# Patient Record
Sex: MALE | Race: WHITE | NOT HISPANIC OR LATINO | Employment: FULL TIME | URBAN - METROPOLITAN AREA
[De-identification: names, ages, dates, MRNs, and addresses within clinical notes are randomized per-mention and may not be internally consistent; named-entity substitution may affect disease eponyms.]

---

## 2017-01-11 ENCOUNTER — OFFICE VISIT (OUTPATIENT)
Dept: URGENT CARE | Facility: CLINIC | Age: 27
End: 2017-01-11
Payer: COMMERCIAL

## 2017-01-11 VITALS
DIASTOLIC BLOOD PRESSURE: 72 MMHG | HEIGHT: 72 IN | BODY MASS INDEX: 27.63 KG/M2 | RESPIRATION RATE: 18 BRPM | OXYGEN SATURATION: 97 % | SYSTOLIC BLOOD PRESSURE: 110 MMHG | WEIGHT: 204 LBS | TEMPERATURE: 99.2 F | HEART RATE: 90 BPM

## 2017-01-11 DIAGNOSIS — J40 BRONCHITIS: ICD-10-CM

## 2017-01-11 DIAGNOSIS — J32.9 OTHER SINUSITIS: ICD-10-CM

## 2017-01-11 PROCEDURE — 99214 OFFICE O/P EST MOD 30 MIN: CPT | Performed by: PHYSICIAN ASSISTANT

## 2017-01-11 RX ORDER — AZITHROMYCIN 250 MG/1
TABLET, FILM COATED ORAL
Qty: 6 TAB | Refills: 1 | Status: SHIPPED | OUTPATIENT
Start: 2017-01-11 | End: 2018-01-22

## 2017-01-11 RX ORDER — ALBUTEROL SULFATE 90 UG/1
2 AEROSOL, METERED RESPIRATORY (INHALATION) EVERY 4 HOURS PRN
Qty: 1 INHALER | Refills: 0 | Status: SHIPPED | OUTPATIENT
Start: 2017-01-11 | End: 2018-01-22

## 2017-01-11 RX ORDER — ACETAMINOPHEN 500 MG
500-1000 TABLET ORAL EVERY 6 HOURS PRN
COMMUNITY
End: 2018-01-22

## 2017-01-11 ASSESSMENT — ENCOUNTER SYMPTOMS
CARDIOVASCULAR NEGATIVE: 1
SHORTNESS OF BREATH: 0
COUGH: 1
SPUTUM PRODUCTION: 1
FEVER: 0
EYES NEGATIVE: 1
SORE THROAT: 0
CONSTITUTIONAL NEGATIVE: 1

## 2017-01-11 NOTE — MR AVS SNAPSHOT
Roman Magana   2017 2:45 PM   Office Visit   MRN: [id number]    Department:  [facility]   Dept Phone:  [phone]    Description:  Male : 1990   Provider:  Minh Rangel PA-C           Reason for Visit     Cough x 2-3 days having cough and nasal congestion and drainage, dome headache, when he walks or does too much he gets dizzy       Allergies as of 2017     No Known Allergies      You were diagnosed with     Other sinusitis   [[id number]]       Bronchitis   [[id number]]         Vital Signs     Blood Pressure Pulse Temperature Respirations Height Weight    110/72 mmHg 90 37.3 °C (99.2 °F) 18 1.829 m (6') 92.534 kg (204 lb)    Body Mass Index Oxygen Saturation                27.66 kg/m2 97%          Basic Information     Date Of Birth Sex Race Ethnicity Preferred Language    1990 Male White Non- English      Health Maintenance        Date Due Completion Dates    IMM DTaP/Tdap/Td Vaccine (1 - Tdap) 2007 ---    IMM INFLUENZA (1) 2016 ---            Current Immunizations     No immunizations on file.      Below and/or attached are the medications your provider expects you to take. Review all of your home medications and newly ordered medications with your provider and/or pharmacist. Follow medication instructions as directed by your provider and/or pharmacist. Please keep your medication list with you and share with your provider. Update the information when medications are discontinued, doses are changed, or new medications (including over-the-counter products) are added; and carry medication information at all times in the event of emergency situations     Allergies:  No Known Allergies          Medications  Valid as of: 2017 -  2:59 PM    Generic Name Brand Name Tablet Size Instructions for use    Acetaminophen (Tab) TYLENOL 500 MG Take 500-1,000 mg by mouth every 6 hours as needed.        Albuterol Sulfate (Aero Soln) albuterol 108 (90 BASE)  MCG/ACT Inhale 2 Puffs by mouth every four hours as needed for Shortness of Breath.        Azithromycin (Tab) ZITHROMAX 250 MG z-jefe; U.D.        .                 Medicines prescribed today were sent to:     [facility][id number] - [place] - [address] [place] [address]    Phone: [phone] Fax: [phone]    Open 24 Hours?: No      Medication refill instructions:       If your prescription bottle indicates you have medication refills left, it is not necessary to call your provider’s office. Please contact your pharmacy and they will refill your medication.    If your prescription bottle indicates you do not have any refills left, you may request refills at any time through one of the following ways: The online Dakim system (except Urgent Care), by calling your provider’s office, or by asking your pharmacy to contact your provider’s office with a refill request. Medication refills are processed only during regular business hours and may not be available until the next business day. Your provider may request additional information or to have a follow-up visit with you prior to refilling your medication.   *Please Note: Medication refills are assigned a new Rx number when refilled electronically. Your pharmacy may indicate that no refills were authorized even though a new prescription for the same medication is available at the pharmacy. Please request the medicine by name with the pharmacy before contacting your provider for a refill.           Dakim Access Code: [email or web address]  Expires: 2/10/2017  2:59 PM    Your email address is not on file at [facility].  Email Addresses are required for you to sign up for Dakim, please contact [phone] to verify your personal information and to provide your email address prior to attempting to register for Dakim.    Roman Magana  [address] [place] [address]    Dakim  A secure, online tool to manage your health information          [employer]’s [facility]® is a secure, online tool that connects you to your personalized health information from the privacy of your home -- day or night - making it very easy for you to manage your healthcare. Once the activation process is completed, you can even access your medical information using the SpikeSourcet irma, which is available for free in the Apple Irma store or Google Play store.     To learn more about [facility], visit [email or web address]    There are two levels of access available (as shown below):   My Chart Features  [facility] Primary Care Doctor [facility]  Specialists [facility]  Urgent  Care Non-[facility] Primary Care Doctor   Email your healthcare team securely and privately 24/7 X X X    Manage appointments: schedule your next appointment; view details of past/upcoming appointments X      Request prescription refills. X      View recent personal medical records, including lab and immunizations X X X X   View health record, including health history, allergies, medications X X X X   Read reports about your outpatient visits, procedures, consult and ER notes X X X X   See your discharge summary, which is a recap of your hospital and/or ER visit that includes your diagnosis, lab results, and care plan X X  X     How to register for [facility]:  Once your e-mail address has been verified, follow the following steps to sign up for [facility].     1. Go to  [email or web address]  2. Click on the Sign Up Now box, which takes you to the New Member Sign Up page. You will need to provide the following information:  a. Enter your [facility] Access Code exactly as it appears at the top of this page. (You will not need to use this code after you’ve completed the sign-up process. If you do not sign up before the expiration date, you must request a new code.)   b. Enter your date of birth.   c. Enter your home email address.   d. Click Submit, and follow the next screen’s instructions.  3. Create a [facility] ID. This will be  your [employer] login ID and cannot be changed, so think of one that is secure and easy to remember.  4. Create a Seakeeper password. You can change your password at any time.  5. Enter your Password Reset Question and Answer. This can be used at a later time if you forget your password.   6. Enter your e-mail address. This allows you to receive e-mail notifications when new information is available in Seakeeper.  7. Click Sign Up. You can now view your health information.    For assistance activating your Seakeeper account, call [phone]

## 2017-01-11 NOTE — PROGRESS NOTES
Subjective:      Roman Magana is a 26 y.o. male who presents with Cough            Cough  This is a new problem. The current episode started in the past 7 days. The problem has been unchanged. The problem occurs every few minutes. The cough is productive of sputum. Associated symptoms include nasal congestion. Pertinent negatives include no fever, sore throat or shortness of breath. Nothing aggravates the symptoms. He has tried nothing for the symptoms. The treatment provided no relief. There is no history of asthma or environmental allergies.       Review of Systems   Constitutional: Negative.  Negative for fever.   HENT: Positive for congestion. Negative for sore throat.    Eyes: Negative.    Respiratory: Positive for cough and sputum production. Negative for shortness of breath.    Cardiovascular: Negative.    Skin: Negative.    Endo/Heme/Allergies: Negative for environmental allergies.          Objective:     /72 mmHg  Pulse 90  Temp(Src) 37.3 °C (99.2 °F)  Resp 18  Ht 1.829 m (6')  Wt 92.534 kg (204 lb)  BMI 27.66 kg/m2  SpO2 97%     Physical Exam   Constitutional: He is oriented to person, place, and time. He appears well-developed and well-nourished. No distress.   HENT:   Head: Normocephalic and atraumatic.   Mouth/Throat: No oropharyngeal exudate (+maxill sinus pasha/press).   Eyes: EOM are normal. Pupils are equal, round, and reactive to light.   Neck: Normal range of motion. Neck supple.   Cardiovascular: Normal rate.    Pulmonary/Chest: Effort normal and breath sounds normal. No respiratory distress. He has no wheezes. He has no rales.   Neurological: He is alert and oriented to person, place, and time.   Skin: Skin is warm and dry.   Psychiatric: He has a normal mood and affect. His behavior is normal. Judgment and thought content normal.   Nursing note and vitals reviewed.    Filed Vitals:    01/11/17 1442   BP: 110/72   Pulse: 90   Temp: 37.3 °C (99.2 °F)   Resp: 18   Height:  1.829 m (6')   Weight: 92.534 kg (204 lb)   SpO2: 97%     Active Ambulatory Problems     Diagnosis Date Noted   • No Active Ambulatory Problems     Resolved Ambulatory Problems     Diagnosis Date Noted   • No Resolved Ambulatory Problems     No Additional Past Medical History     No current outpatient prescriptions on file prior to visit.     No current facility-administered medications on file prior to visit.     Gargles, Cepacol lozenges, Aleve/Advil as needed for throat pain  History reviewed. No pertinent family history.  Review of patient's allergies indicates no known allergies.              Assessment/Plan:     ·  sinusitis, bronchitis      · Zpak; albut

## 2018-01-22 ENCOUNTER — OFFICE VISIT (OUTPATIENT)
Dept: MEDICAL GROUP | Facility: PHYSICIAN GROUP | Age: 28
End: 2018-01-22
Payer: COMMERCIAL

## 2018-01-22 ENCOUNTER — HOSPITAL ENCOUNTER (OUTPATIENT)
Facility: MEDICAL CENTER | Age: 28
End: 2018-01-22
Attending: NURSE PRACTITIONER
Payer: COMMERCIAL

## 2018-01-22 VITALS
SYSTOLIC BLOOD PRESSURE: 102 MMHG | OXYGEN SATURATION: 95 % | RESPIRATION RATE: 16 BRPM | TEMPERATURE: 97.9 F | HEART RATE: 84 BPM | BODY MASS INDEX: 28.44 KG/M2 | WEIGHT: 210 LBS | DIASTOLIC BLOOD PRESSURE: 70 MMHG | HEIGHT: 72 IN

## 2018-01-22 DIAGNOSIS — Z00.00 PREVENTATIVE HEALTH CARE: ICD-10-CM

## 2018-01-22 DIAGNOSIS — R36.1 EJACULATION BLOOD: ICD-10-CM

## 2018-01-22 DIAGNOSIS — K59.1 FUNCTIONAL DIARRHEA: ICD-10-CM

## 2018-01-22 DIAGNOSIS — Z87.828 HISTORY OF NECK INJURY: ICD-10-CM

## 2018-01-22 LAB
APPEARANCE UR: CLEAR
BILIRUB UR STRIP-MCNC: NEGATIVE MG/DL
COLOR UR AUTO: YELLOW
GLUCOSE UR STRIP.AUTO-MCNC: NEGATIVE MG/DL
KETONES UR STRIP.AUTO-MCNC: NEGATIVE MG/DL
LEUKOCYTE ESTERASE UR QL STRIP.AUTO: NEGATIVE
NITRITE UR QL STRIP.AUTO: NEGATIVE
PH UR STRIP.AUTO: 6.5 [PH] (ref 5–8)
PROT UR QL STRIP: NEGATIVE MG/DL
RBC UR QL AUTO: NORMAL
SP GR UR STRIP.AUTO: 1.01
UROBILINOGEN UR STRIP-MCNC: NEGATIVE MG/DL

## 2018-01-22 PROCEDURE — 87491 CHLMYD TRACH DNA AMP PROBE: CPT

## 2018-01-22 PROCEDURE — 81002 URINALYSIS NONAUTO W/O SCOPE: CPT | Performed by: NURSE PRACTITIONER

## 2018-01-22 PROCEDURE — 87591 N.GONORRHOEAE DNA AMP PROB: CPT

## 2018-01-22 PROCEDURE — 87086 URINE CULTURE/COLONY COUNT: CPT

## 2018-01-22 PROCEDURE — 99214 OFFICE O/P EST MOD 30 MIN: CPT | Mod: 25 | Performed by: NURSE PRACTITIONER

## 2018-01-22 ASSESSMENT — PAIN SCALES - GENERAL: PAINLEVEL: NO PAIN

## 2018-01-22 ASSESSMENT — PATIENT HEALTH QUESTIONNAIRE - PHQ9: CLINICAL INTERPRETATION OF PHQ2 SCORE: 0

## 2018-01-23 DIAGNOSIS — R36.1 EJACULATION BLOOD: ICD-10-CM

## 2018-01-23 NOTE — PROGRESS NOTES
Roman Magana is a 28 y.o. white male here today to establish care and for evaluation and management of:    HPI:      History of neck injury  Currently having some physical therapy.  Having some numbness in left hand.  Workers comp injury. I discussed with him that I am not licensed to deal with workers comp injuries and he will should return to his Worker's Comp. provider.    Functional diarrhea  Has had symptoms for the last year.  30-45 minutes after eating has to have bowel movement.  Some days multiple times, liquid stools.  No blood or tarry stools reported. No recent travel or backpacking reported.  No fever chills. No bloating reported, stomach cramps.    Taking probiotics with some improvement.  Happens with dairy, meat, vegan.   No nausea reported or vomitting.     Ejaculation blood  States he noticed brown ejaculate on new years day.  This cleared after two days.  No reported dysuria, swelling in scrotum or penile discharge. No new sexual partner.  No injury to scrotum reported.        Current medicines (including changes today)  No current outpatient prescriptions on file.     No current facility-administered medications for this visit.        He  has no past medical history on file.    He  has a past surgical history that includes appendectomy.    Social History   Substance Use Topics   • Smoking status: Former Smoker     Quit date: 1/22/2011   • Smokeless tobacco: Never Used   • Alcohol use 3.6 oz/week     6 Cans of beer per week      Comment: week       Social History     Social History Narrative   • No narrative on file       Family History   Problem Relation Age of Onset   • Cancer Mother      breast   • No Known Problems Father    • Diabetes Maternal Grandfather    • Heart Disease Maternal Grandfather        Family Status   Relation Status   • Mother Alive   • Father    • Maternal Grandfather          ROS  As stated in history of present illness  All other systems reviewed and are  negative     Objective:     Blood pressure 102/70, pulse 84, temperature 36.6 °C (97.9 °F), resp. rate 16, height 1.829 m (6'), weight 95.3 kg (210 lb), SpO2 95 %. Body mass index is 28.48 kg/m².  Physical Exam:    Constitutional: Alert, no distress.  Skin: Warm, dry, good turgor, no rashes in visible areas.  Eye: Equal, round and reactive, conjunctiva clear, lids normal.  ENMT: Lips without lesions, good dentition, oropharynx clear.  Neck: Trachea midline, no masses, no thyromegaly. No cervical or supraclavicular lymphadenopathy.  Respiratory: Unlabored respiratory effort, lungs clear to auscultation, no wheezes, no ronchi.  Cardiovascular: Normal S1, S2, no murmur, no edema.  Abdomen: Soft, non-tender, no masses, no hepatosplenomegaly.  Psych: Alert and oriented x3, normal affect and mood.  : No scrotal swelling or testicular pain reported no penile discharge    Assessment and Plan:   The following treatment plan was discussed    1. Preventative health care  This is a new patient to me. We will obtain lab work baseline. Monitor and follow results.  - CBC WITH DIFFERENTIAL; Future  - COMP METABOLIC PANEL; Future  - LIPID PROFILE; Future  - TSH; Future    2. History of neck injury  This is a new problem to me. Chronic. This was a worker's injury. Patient advised to return to his Worker's Comp. provider as I am not licensed to care for his Worker's Comp. injury.    3. Functional diarrhea  This is a new problem to me. Chronic. Unknown etiology. Patient's symptoms seem to be responding to probiotics. We discussed at length probiotics and fiber in terms of helping functional diarrhea. Patient will increase his probiotic use to 5 billion bacteria per day and increase his fiber to 25 g per day. We will check back in 30 days. Monitor and follow.    4. Ejaculation blood  This is a new problem to me. Acute. Unknown etiology. Urinalysis in the office today what had a trace of blood. We will send out a urine culture we will  also test for chlamydia and gonorrhea. Monitor and follow results.  - POCT Urinalysis  - URINE CULTURE(NEW); Future  - CHLAMYDIA/GC PCR URINE OR SWAB; Future      Records requested.  Followup: Return if symptoms worsen or fail to improve.

## 2018-01-23 NOTE — ASSESSMENT & PLAN NOTE
Currently having some physical therapy.  Having some numbness in left hand.  Workers comp injury. I discussed with him that I am not licensed to deal with workers comp injuries and he will should return to his Worker's Comp. provider.

## 2018-01-23 NOTE — ASSESSMENT & PLAN NOTE
Has had symptoms for the last year.  30-45 minutes after eating has to have bowel movement.  Some days multiple times, liquid stools.  No blood or tarry stools reported. No recent travel or backpacking reported.  No fever chills. No bloating reported, stomach cramps.    Taking probiotics with some improvement.  Happens with dairy, meat, vegan.   No nausea reported or vomitting.

## 2018-01-23 NOTE — ASSESSMENT & PLAN NOTE
States he noticed brown ejaculate on new years day.  This cleared after two days.  No reported dysuria, swelling in scrotum or penile discharge. No new sexual partner.  No injury to scrotum reported.

## 2018-01-24 LAB
C TRACH DNA SPEC QL NAA+PROBE: NEGATIVE
N GONORRHOEA DNA SPEC QL NAA+PROBE: NEGATIVE
SPECIMEN SOURCE: NORMAL

## 2018-01-25 LAB
BACTERIA UR CULT: NORMAL
SIGNIFICANT IND 70042: NORMAL
SITE SITE: NORMAL
SOURCE SOURCE: NORMAL

## 2018-01-29 ENCOUNTER — PATIENT MESSAGE (OUTPATIENT)
Dept: MEDICAL GROUP | Facility: PHYSICIAN GROUP | Age: 28
End: 2018-01-29

## 2018-02-03 ENCOUNTER — HOSPITAL ENCOUNTER (OUTPATIENT)
Dept: LAB | Facility: MEDICAL CENTER | Age: 28
End: 2018-02-03
Attending: NURSE PRACTITIONER
Payer: COMMERCIAL

## 2018-02-03 DIAGNOSIS — Z00.00 PREVENTATIVE HEALTH CARE: ICD-10-CM

## 2018-02-03 LAB
ALBUMIN SERPL BCP-MCNC: 4.1 G/DL (ref 3.2–4.9)
ALBUMIN/GLOB SERPL: 1.5 G/DL
ALP SERPL-CCNC: 57 U/L (ref 30–99)
ALT SERPL-CCNC: 25 U/L (ref 2–50)
ANION GAP SERPL CALC-SCNC: 8 MMOL/L (ref 0–11.9)
AST SERPL-CCNC: 20 U/L (ref 12–45)
BASOPHILS # BLD AUTO: 1.8 % (ref 0–1.8)
BASOPHILS # BLD: 0.09 K/UL (ref 0–0.12)
BILIRUB SERPL-MCNC: 0.6 MG/DL (ref 0.1–1.5)
BUN SERPL-MCNC: 15 MG/DL (ref 8–22)
CALCIUM SERPL-MCNC: 8.8 MG/DL (ref 8.5–10.5)
CHLORIDE SERPL-SCNC: 106 MMOL/L (ref 96–112)
CHOLEST SERPL-MCNC: 159 MG/DL (ref 100–199)
CO2 SERPL-SCNC: 27 MMOL/L (ref 20–33)
CREAT SERPL-MCNC: 1.05 MG/DL (ref 0.5–1.4)
EOSINOPHIL # BLD AUTO: 0.28 K/UL (ref 0–0.51)
EOSINOPHIL NFR BLD: 5.7 % (ref 0–6.9)
ERYTHROCYTE [DISTWIDTH] IN BLOOD BY AUTOMATED COUNT: 41.8 FL (ref 35.9–50)
GLOBULIN SER CALC-MCNC: 2.7 G/DL (ref 1.9–3.5)
GLUCOSE SERPL-MCNC: 92 MG/DL (ref 65–99)
HCT VFR BLD AUTO: 48.1 % (ref 42–52)
HDLC SERPL-MCNC: 42 MG/DL
HGB BLD-MCNC: 16.9 G/DL (ref 14–18)
IMM GRANULOCYTES # BLD AUTO: 0.01 K/UL (ref 0–0.11)
IMM GRANULOCYTES NFR BLD AUTO: 0.2 % (ref 0–0.9)
LDLC SERPL CALC-MCNC: 103 MG/DL
LYMPHOCYTES # BLD AUTO: 2.22 K/UL (ref 1–4.8)
LYMPHOCYTES NFR BLD: 45.4 % (ref 22–41)
MCH RBC QN AUTO: 32.3 PG (ref 27–33)
MCHC RBC AUTO-ENTMCNC: 35.1 G/DL (ref 33.7–35.3)
MCV RBC AUTO: 92 FL (ref 81.4–97.8)
MONOCYTES # BLD AUTO: 0.45 K/UL (ref 0–0.85)
MONOCYTES NFR BLD AUTO: 9.2 % (ref 0–13.4)
NEUTROPHILS # BLD AUTO: 1.84 K/UL (ref 1.82–7.42)
NEUTROPHILS NFR BLD: 37.7 % (ref 44–72)
NRBC # BLD AUTO: 0 K/UL
NRBC BLD-RTO: 0 /100 WBC
PLATELET # BLD AUTO: 146 K/UL (ref 164–446)
PMV BLD AUTO: 13.1 FL (ref 9–12.9)
POTASSIUM SERPL-SCNC: 3.8 MMOL/L (ref 3.6–5.5)
PROT SERPL-MCNC: 6.8 G/DL (ref 6–8.2)
RBC # BLD AUTO: 5.23 M/UL (ref 4.7–6.1)
SODIUM SERPL-SCNC: 141 MMOL/L (ref 135–145)
TRIGL SERPL-MCNC: 72 MG/DL (ref 0–149)
TSH SERPL DL<=0.005 MIU/L-ACNC: 1.28 UIU/ML (ref 0.38–5.33)
WBC # BLD AUTO: 4.9 K/UL (ref 4.8–10.8)

## 2018-02-03 PROCEDURE — 85025 COMPLETE CBC W/AUTO DIFF WBC: CPT

## 2018-02-03 PROCEDURE — 80053 COMPREHEN METABOLIC PANEL: CPT

## 2018-02-03 PROCEDURE — 80061 LIPID PANEL: CPT

## 2018-02-03 PROCEDURE — 84443 ASSAY THYROID STIM HORMONE: CPT

## 2018-02-03 PROCEDURE — 36415 COLL VENOUS BLD VENIPUNCTURE: CPT

## 2018-02-05 ENCOUNTER — TELEPHONE (OUTPATIENT)
Dept: MEDICAL GROUP | Facility: PHYSICIAN GROUP | Age: 28
End: 2018-02-05

## 2018-02-06 NOTE — TELEPHONE ENCOUNTER
----- Message from NORY Robbins sent at 2/5/2018  2:26 PM PST -----  Roman  All labs are back and within normal/acceptable ranges.    NORY Robbins

## 2018-03-13 ENCOUNTER — OFFICE VISIT (OUTPATIENT)
Dept: MEDICAL GROUP | Facility: PHYSICIAN GROUP | Age: 28
End: 2018-03-13
Payer: COMMERCIAL

## 2018-03-13 ENCOUNTER — HOSPITAL ENCOUNTER (OUTPATIENT)
Dept: RADIOLOGY | Facility: MEDICAL CENTER | Age: 28
End: 2018-03-13
Attending: NURSE PRACTITIONER
Payer: COMMERCIAL

## 2018-03-13 VITALS
OXYGEN SATURATION: 95 % | WEIGHT: 210 LBS | SYSTOLIC BLOOD PRESSURE: 120 MMHG | RESPIRATION RATE: 14 BRPM | BODY MASS INDEX: 28.44 KG/M2 | HEIGHT: 72 IN | DIASTOLIC BLOOD PRESSURE: 80 MMHG | HEART RATE: 84 BPM | TEMPERATURE: 97.3 F

## 2018-03-13 DIAGNOSIS — Z87.828 HISTORY OF NECK INJURY: ICD-10-CM

## 2018-03-13 PROCEDURE — 99213 OFFICE O/P EST LOW 20 MIN: CPT | Performed by: NURSE PRACTITIONER

## 2018-03-13 PROCEDURE — 72040 X-RAY EXAM NECK SPINE 2-3 VW: CPT
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
## 2018-03-14 ENCOUNTER — PATIENT MESSAGE (OUTPATIENT)
Dept: MEDICAL GROUP | Facility: PHYSICIAN GROUP | Age: 28
End: 2018-03-14

## 2018-03-14 DIAGNOSIS — M54.2 CHRONIC NECK PAIN: ICD-10-CM

## 2018-03-14 DIAGNOSIS — G89.29 CHRONIC NECK PAIN: ICD-10-CM

## 2018-03-14 NOTE — PROGRESS NOTES
Chief Complaint   Patient presents with   • Numbness     left hand/arm and left leg        Subjective:   Roman Magana is a 28 y.o. male here today for evaluation and management of:    History of neck injury  Presents with left hand numbness and tingling since injury.  Is worsening.  [employer] is denying that this is a workers comp issue. Injury was October 2017.  Did not lose consciousness. Had neck xrays at that time.  He was told they were negative.              Current medicines (including changes today)  No current outpatient prescriptions on file.     No current facility-administered medications for this visit.      He  has no past medical history on file.    ROS as stated om j[o  No chest pain, no shortness of breath, no abdominal pain       Objective:     Blood pressure 120/80, pulse 84, temperature 36.3 °C (97.3 °F), resp. rate 14, height 1.829 m (6'), weight 95.3 kg (210 lb), SpO2 95 %. Body mass index is 28.48 kg/m².   Physical Exam:  Constitutional: Alert, no distress.  Skin: Warm, dry, good turgor,no cyanosis, no rashes in visible areas.  Eye: Equal, round and reactive, conjunctiva clear, lids normal.  Ears: No tenderness, no discharge.  External canals are without any significant edema or erythema.  Tympanic membranes are without any inflammation, no effusion.  Gross auditory acuity is intact.  Nose: symmetrical without tenderness, no discharge.  Mouth/Throat: lips without lesion.  Oropharynx clear.  Throat without erythema, exudates or tonsillar enlargement.  Neck: Trachea midline, no masses, no obvious thyroid enlargement.. No cervical or supraclavicular lymphadenopathy. Range of motion within normal limits.  Neuro: Cranial nerves 2-12 grossly intact.  Left hand numbness on thumb and index finger with decreased sensation and dexterity when compared with right.   Respiratory: Unlabored respiratory effort, lungs clear to auscultation, no wheezes, no ronchi.  Cardiovascular: Normal S1, S2, no  murmur, no edema.  Abdomen: Soft, non-tender, no masses, no guarding,  no hepatosplenomegaly.  Psych: Alert and oriented x3, normal affect and mood and judgement.        Assessment and Plan:   The following treatment plan was discussed    1. History of neck injury  Chronic. Ongoing.  Will order cspine.  No longer pursuing workers comp. May consider referral to spine.  MOnitor results.    - DX-CERVICAL SPINE-4+ VIEWS; Future      Followup: Return if symptoms worsen or fail to improve.

## 2018-03-26 ENCOUNTER — PATIENT MESSAGE (OUTPATIENT)
Dept: MEDICAL GROUP | Facility: PHYSICIAN GROUP | Age: 28
End: 2018-03-26

## 2018-03-27 ENCOUNTER — PATIENT MESSAGE (OUTPATIENT)
Dept: MEDICAL GROUP | Facility: PHYSICIAN GROUP | Age: 28
End: 2018-03-27
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
## 2018-03-28 ENCOUNTER — HOSPITAL ENCOUNTER (OUTPATIENT)
Dept: RADIOLOGY | Facility: MEDICAL CENTER | Age: 28
End: 2018-03-28
Attending: NURSE PRACTITIONER
Payer: COMMERCIAL

## 2018-03-28 DIAGNOSIS — G89.29 CHRONIC NECK PAIN: ICD-10-CM

## 2018-03-28 DIAGNOSIS — M54.2 CHRONIC NECK PAIN: ICD-10-CM

## 2018-03-28 PROCEDURE — 72141 MRI NECK SPINE W/O DYE: CPT
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
## 2018-03-29 ENCOUNTER — PATIENT MESSAGE (OUTPATIENT)
Dept: MEDICAL GROUP | Facility: PHYSICIAN GROUP | Age: 28
End: 2018-03-29

## 2018-03-29 DIAGNOSIS — D49.2 CERVICAL SPINE TUMOR: ICD-10-CM

## 2018-04-04 ENCOUNTER — HOSPITAL ENCOUNTER (OUTPATIENT)
Dept: RADIOLOGY | Facility: MEDICAL CENTER | Age: 28
End: 2018-04-04

## 2018-09-29 ENCOUNTER — OFFICE VISIT (OUTPATIENT)
Dept: URGENT CARE | Facility: PHYSICIAN GROUP | Age: 28
End: 2018-09-29
Payer: COMMERCIAL

## 2018-09-29 VITALS
HEART RATE: 90 BPM | TEMPERATURE: 97.6 F | SYSTOLIC BLOOD PRESSURE: 120 MMHG | BODY MASS INDEX: 27.09 KG/M2 | DIASTOLIC BLOOD PRESSURE: 78 MMHG | WEIGHT: 200 LBS | HEIGHT: 72 IN | RESPIRATION RATE: 20 BRPM | OXYGEN SATURATION: 100 %

## 2018-09-29 DIAGNOSIS — R11.2 NAUSEA AND VOMITING, INTRACTABILITY OF VOMITING NOT SPECIFIED, UNSPECIFIED VOMITING TYPE: ICD-10-CM

## 2018-09-29 PROCEDURE — 99214 OFFICE O/P EST MOD 30 MIN: CPT | Performed by: FAMILY MEDICINE

## 2018-09-29 RX ORDER — ONDANSETRON 4 MG/1
4 TABLET, ORALLY DISINTEGRATING ORAL EVERY 8 HOURS PRN
Qty: 10 TAB | Refills: 0 | Status: SHIPPED | OUTPATIENT
Start: 2018-09-29 | End: 2021-07-07

## 2018-09-29 RX ORDER — ONDANSETRON 2 MG/ML
8 INJECTION INTRAMUSCULAR; INTRAVENOUS ONCE
Status: COMPLETED | OUTPATIENT
Start: 2018-09-29 | End: 2018-09-29

## 2018-09-29 RX ADMIN — ONDANSETRON 8 MG: 2 INJECTION INTRAMUSCULAR; INTRAVENOUS at 09:35

## 2018-09-29 NOTE — PROGRESS NOTES
Chief Complaint   Patient presents with   • Emesis     cant keep anything down since last night            Emesis  This is a new problem.   He c/o several episodes of emesis from 3am until now.   He had approximately 5-6 alcoholic drinks last night.        The problem has been unchanged.  no blood in emesis.  Associated symptoms include: none. Pertinent negatives include no abdominal pain, chills, coughing, fever, headaches, or weight loss. Nothing aggravates the symptoms. There are no known risk factors. Patient has tried nothing for the symptoms. There is no history of inflammatory bowel disease.      Past medical history was unremarkable and not pertinent to current issue      Social History   Substance Use Topics   • Smoking status: Former Smoker     Quit date: 1/22/2011   • Smokeless tobacco: Never Used   • Alcohol use 3.6 oz/week     6 Cans of beer per week      Comment: week                  Review of Systems   Constitutional: Negative for fever, chills and weight loss.   Respiratory: Negative for cough and wheezing.    Cardiovascular: Negative for chest pain.   Gastrointestinal:   Negative for  blood in stool.   Neurological: Negative for dizziness and headaches.   All other systems reviewed and are negative.         Objective:     Blood pressure 120/78, pulse 90, temperature 36.4 °C (97.6 °F), temperature source Temporal, resp. rate 20, height 1.829 m (6'), weight 90.7 kg (200 lb), SpO2 100 %.      Physical Exam   Constitutional: pt is oriented to person, place, and time and appears well-developed. No distress.   HENT:   Head: Normocephalic and atraumatic.   Mouth/Throat: No oropharyngeal exudate.   Mucous membranes dry  Eyes: Conjunctivae are normal. No scleral icterus.   Cardiovascular: Normal rate, regular rhythm and normal heart sounds.    Pulmonary/Chest: Effort normal and breath sounds normal. No respiratory distress. Pt has no wheezes. Pt has no rales.   Abdominal: Normal appearance and bowel  sounds are normal. There is no splenomegaly or hepatomegaly. There is no tenderness. There is no rebound, no guarding, no CVA tenderness and no tenderness at McBurney's point.   Lymphadenopathy:     Pt has no cervical adenopathy.   Neurological: pt is alert and oriented to person, place, and time.   Skin: Skin is warm. Pt is not diaphoretic. No erythema.   Psychiatric:  behavior is normal.   Nursing note and vitals reviewed.              Assessment/Plan:        1. Nausea and vomiting, intractability of vomiting not specified, unspecified vomiting type      Likely related to alcohol use last night    Nausea improved after zofran, compazine      Able to tolerate PO fluid challenge     - ondansetron (ZOFRAN) syringe/vial injection 8 mg; 4 mL by Intramuscular route Once.  - ondansetron (ZOFRAN ODT) 4 MG TABLET DISPERSIBLE; Take 1 Tab by mouth every 8 hours as needed.  Dispense: 10 Tab; Refill: 0       Follow up in one week if no improvement, sooner if symptoms worsen.

## 2018-11-14 ENCOUNTER — HOSPITAL ENCOUNTER (OUTPATIENT)
Dept: RADIOLOGY | Facility: MEDICAL CENTER | Age: 28
End: 2018-11-14
Attending: NEUROLOGICAL SURGERY
Payer: COMMERCIAL

## 2018-11-14 DIAGNOSIS — D33.4 BENIGN NEOPLASM OF SPINAL CORD (HCC): ICD-10-CM

## 2018-11-14 PROCEDURE — 700117 HCHG RX CONTRAST REV CODE 255: Performed by: NEUROLOGICAL SURGERY

## 2018-11-14 PROCEDURE — A9585 GADOBUTROL INJECTION: HCPCS | Performed by: NEUROLOGICAL SURGERY

## 2018-11-14 PROCEDURE — 72156 MRI NECK SPINE W/O & W/DYE: CPT

## 2018-11-14 RX ORDER — GADOBUTROL 604.72 MG/ML
9 INJECTION INTRAVENOUS ONCE
Status: COMPLETED | OUTPATIENT
Start: 2018-11-14 | End: 2018-11-14

## 2018-11-14 RX ADMIN — GADOBUTROL 9 ML: 604.72 INJECTION INTRAVENOUS at 14:06

## 2019-09-16 ENCOUNTER — HOSPITAL ENCOUNTER (OUTPATIENT)
Dept: RADIOLOGY | Facility: MEDICAL CENTER | Age: 29
End: 2019-09-16
Attending: PHYSICIAN ASSISTANT
Payer: COMMERCIAL

## 2019-09-16 ENCOUNTER — OFFICE VISIT (OUTPATIENT)
Dept: URGENT CARE | Facility: PHYSICIAN GROUP | Age: 29
End: 2019-09-16
Payer: COMMERCIAL

## 2019-09-16 VITALS
DIASTOLIC BLOOD PRESSURE: 82 MMHG | OXYGEN SATURATION: 99 % | TEMPERATURE: 97.7 F | SYSTOLIC BLOOD PRESSURE: 118 MMHG | WEIGHT: 205 LBS | HEART RATE: 82 BPM | HEIGHT: 72 IN | RESPIRATION RATE: 18 BRPM | BODY MASS INDEX: 27.77 KG/M2

## 2019-09-16 DIAGNOSIS — S99.919A ANKLE INJURY, INITIAL ENCOUNTER: ICD-10-CM

## 2019-09-16 PROCEDURE — 99214 OFFICE O/P EST MOD 30 MIN: CPT | Performed by: PHYSICIAN ASSISTANT

## 2019-09-16 PROCEDURE — 73610 X-RAY EXAM OF ANKLE: CPT | Mod: RT

## 2019-09-16 ASSESSMENT — ENCOUNTER SYMPTOMS
SENSORY CHANGE: 0
CHILLS: 0
WEAKNESS: 0
ABDOMINAL PAIN: 0
FLANK PAIN: 0
FALLS: 1
SHORTNESS OF BREATH: 0
BLURRED VISION: 0
DIZZINESS: 0
BACK PAIN: 0
TINGLING: 0
BRUISES/BLEEDS EASILY: 0
FEVER: 0
NECK PAIN: 0
HEADACHES: 0

## 2019-09-16 NOTE — PROGRESS NOTES
Subjective:      Roman Magana is a 29 y.o. male who presents with Ankle Injury (R ankle [id number] )            HPI  30 y/o male presents to  with new problem of right ankle pain and swelling secondary to injury that occurred 3 days ago. Patient reports he was dalia diving and injured right ankle while landing.   Reports limited ROM.   Reports pain is 1/10 and worse with movement and ambulation.   Has tried OTC ibuprofen, ice to affected area and elevation with some relief.   Hx of previous ankle sprains and fractures bilaterally.   Denies any change in sensation.   Denies other associated aggravating or alleviating factors.     Review of Systems   Constitutional: Negative for chills and fever.   Eyes: Negative for blurred vision.   Respiratory: Negative for shortness of breath.    Cardiovascular: Negative for chest pain.   Gastrointestinal: Negative for abdominal pain.   Genitourinary: Negative for flank pain.   Musculoskeletal: Positive for falls and joint pain. Negative for back pain and neck pain.        Right ankle pain and swelling    Neurological: Negative for dizziness, tingling, sensory change, weakness and headaches.   Endo/Heme/Allergies: Does not bruise/bleed easily.       History reviewed. No pertinent past medical history.  Current Outpatient Medications on File Prior to Visit   Medication Sig Dispense Refill   • ondansetron (ZOFRAN ODT) 4 MG TABLET DISPERSIBLE Take 1 Tab by mouth every 8 hours as needed. (Patient not taking: Reported on 2019) 10 Tab 0     No current facility-administered medications on file prior to visit.      No Known Allergies  Social History     Tobacco Use   • Smoking status: Former Smoker     Last attempt to quit: 2011     Years since quittin.6   • Smokeless tobacco: Never Used   Substance Use Topics   • Alcohol use: Yes     Alcohol/week: 3.6 oz     Types: 6 Cans of beer per week     Comment: week       Objective:     /82   Pulse 82   Temp 36.5 °C  (97.7 °F) (Temporal)   Resp 18   Ht 1.829 m (6')   Wt 93 kg (205 lb)   SpO2 99%   BMI 27.80 kg/m²      Physical Exam   Constitutional: He is oriented to person, place, and time. He appears well-developed and well-nourished.   HENT:   Head: Normocephalic and atraumatic.   Mouth/Throat: Oropharynx is clear and moist.   Eyes: Conjunctivae and EOM are normal.   Neck: Normal range of motion. Neck supple.   Cardiovascular: Normal rate.   Pulmonary/Chest: Effort normal. No respiratory distress.   Musculoskeletal:        Feet:    Neurological: He is alert and oriented to person, place, and time. No sensory deficit.   Limping gait secondary to pain   Skin: Skin is warm and dry.   Psychiatric: He has a normal mood and affect. His behavior is normal. Judgment and thought content normal.   Vitals reviewed.              Assessment/Plan:     1. Ankle injury, initial encounter  DX-ANKLE 3+ VIEWS RIGHT    REFERRAL TO SPORTS MEDICINE       Impression       1.  Displaced mildly impacted acute fracture of the posterior malleolus is identified.    2.  Probable old fracture fragment inferior to the lateral malleolus.    3.  There appears to be mildly increased distance between the lateral malleolus and the talus which could indicate ligamentous disruption.    4.  Soft tissue swelling is noted anteriorly and laterally.        Recommend OTC tylenol and ibuprofen for pain relief. Continue to elevate and apply ice to affected area. Patient fitted for long boot and non-weightbearing on crutches.   I spoke with Dr. Mendez directly, who agrees to further evaluate patient and assume patient care. Per his interpretation of imaging done today in clinic, fracture is most likely less than 25% of articular surface which can be treated non-surgically.   Discussed red flag with patient regarding reasons to return for reevaluation or proceed to the emergency room including increasing pain out of proportion and change of sensation in right lower  extremity.   Patient verbalized understand of treatment plan and has no further questions regarding care.

## 2019-09-19 ENCOUNTER — OFFICE VISIT (OUTPATIENT)
Dept: MEDICAL GROUP | Facility: CLINIC | Age: 29
End: 2019-09-19
Payer: COMMERCIAL

## 2019-09-19 VITALS
DIASTOLIC BLOOD PRESSURE: 80 MMHG | SYSTOLIC BLOOD PRESSURE: 122 MMHG | HEIGHT: 72 IN | HEART RATE: 76 BPM | RESPIRATION RATE: 16 BRPM | WEIGHT: 205 LBS | TEMPERATURE: 98.1 F | OXYGEN SATURATION: 96 % | BODY MASS INDEX: 27.77 KG/M2

## 2019-09-19 DIAGNOSIS — S82.871A: ICD-10-CM

## 2019-09-19 PROCEDURE — 27760 CLTX MEDIAL ANKLE FX: CPT | Mod: RT | Performed by: FAMILY MEDICINE

## 2019-09-19 RX ORDER — IBUPROFEN 200 MG
200 TABLET ORAL EVERY 6 HOURS PRN
COMMUNITY

## 2019-09-19 RX ORDER — ACETAMINOPHEN 500 MG
500-1000 TABLET ORAL EVERY 6 HOURS PRN
COMMUNITY

## 2019-09-19 ASSESSMENT — ENCOUNTER SYMPTOMS
FEVER: 0
DIZZINESS: 0
CHILLS: 0
NAUSEA: 0
HEADACHES: 0
VOMITING: 0
SHORTNESS OF BREATH: 0

## 2019-09-19 NOTE — LETTER
September 19, 2019         Patient: Roman Magana   YOB: 1990   Date of Visit: 9/19/2019           To Whom it May Concern:    Roman Magana was seen in my clinic on 9/19/2019. He may return to work, but CANNOT weight-bear on his RIGHT lower extremity.  He should avoid standing or ambulating unless he is commuting    If you have any questions or concerns, please don't hesitate to call.        Sincerely,           Alvaro Mendez M.D.  Electronically Signed

## 2019-09-19 NOTE — PROGRESS NOTES
Subjective:      Roman Magana is a 29 y.o. male who presents with Ankle Injury (F/V R ankle injury )     Referred by Nathalie Thornton P.A.-C. for evaluation of RIGHT ankle pain    HPI   RIGHT ankle pain  Date of injury Friday, September 13, 2019  Mechanism of injury, skydiving, injured while landing  Unclear mechanism of injury  Sudden sharp pain in the right ankle  Pain is predominantly at the medial aspect of the RIGHT ankle  No radiation  Improved with rest and immobilization  POSITIVE soreness and swelling  Taking Tylenol and ibuprofen for pain  Taking medication every 8 hours  POSITIVE prior issues with the RIGHT ankle, sprains and prior fracture of the RIGHT ankle, uncertain of specifics since it occurred during childhood  Occasional night symptoms    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   Respiratory: Negative for shortness of breath.    Cardiovascular: Negative for chest pain.   Gastrointestinal: Negative for nausea and vomiting.   Neurological: Negative for dizziness and headaches.     PMH:  has no past medical history on file.  MEDS:   Current Outpatient Medications:   •  ibuprofen (MOTRIN) 200 MG Tab, Take 200 mg by mouth every 6 hours as needed., Disp: , Rfl:   •  acetaminophen (TYLENOL) 500 MG Tab, Take 500-1,000 mg by mouth every 6 hours as needed., Disp: , Rfl:   •  ondansetron (ZOFRAN ODT) 4 MG TABLET DISPERSIBLE, Take 1 Tab by mouth every 8 hours as needed. (Patient not taking: Reported on 9/16/2019), Disp: 10 Tab, Rfl: 0  ALLERGIES: No Known Allergies  SURGHX:   Past Surgical History:   Procedure Laterality Date   • APPENDECTOMY     • LUMBAR LAMINECTOMY DISKECTOMY      Benign mass excision     SOCHX:  reports that he quit smoking about 8 years ago. He has never used smokeless tobacco.  He reports that he drinks about 3.6 oz of alcohol per week. He reports that he does not use drugs.  FH: Family history was reviewed, no pertinent findings to report     Objective:     /80 (BP Location: Right arm, Patient Position: Sitting, BP Cuff Size: Large adult)   Pulse 76   Temp 36.7 °C (98.1 °F) (Temporal)   Resp 16   Ht 1.829 m (6')   Wt 93 kg (205 lb)   SpO2 96%   BMI 27.80 kg/m²      Physical Exam      LEFT ANKLE:  There is NO swelling noted at the ankle  Range of motion intact with dorsiflexion and plantarflexion, inversion and eversion  There is NO tenderness of the ATFL, CF or PTF ligament  There is NO tenderness of the lateral malleolus or medial malleolus  Anterior drawer testing is NEGATIVE  Talar tilt testing is NEGATIVE  The foot and ankle is otherwise neurovascularly intact    LEFT FOOT:  There is NO swelling noted at the foot  There is NO tenderness at the base of the fifth metatarsal, cuboid, or tarsal navicular  There is NO pain with metatarsal squeeze test    RIGHT ANKLE:  There is POSITIVE significant swelling noted at the ankle  Range of motion limited to approximately 70% normal motion with dorsiflexion and plantarflexion, inversion and eversion  There is MILD tenderness of the ATFL, without tenderness of the CF or PTF ligament  There is NO tenderness of the lateral malleolus but there is POSITIVE significant tenderness of the medial malleolus and the posterior malleolus  Anterior drawer testing is NEGATIVE  Talar tilt testing is NEGATIVE  The foot and ankle is otherwise neurovascularly intact    RIGHT FOOT:  There is POSITIVE swelling noted at the foot  There is NO tenderness at the base of the fifth metatarsal, cuboid, or tarsal navicular  There is NO pain with metatarsal squeeze test    NEUTRAL stance  Able to ambulate with nonweightbearing with crutches gait     Assessment/Plan:     1. Closed fracture of tibial plafond without fibula involvement, right, initial encounter          Date of injury Friday, September 13, 2019  Mechanism of injury, skydiving, injured while landing    Posterior malleolar fracture affecting less than 25% of the joint space (approximately 22%)  Less than 2 mm displacement/impaction   Mortise looks intact  Excellent strength with dorsiflexion, plantarflexion, inversion and eversion    All of the above findings are suggestive of EXCELLENT nonoperative treatment outcome    Recommended casting and nonweightbearing for 4 weeks (until on or after October 17, 2019) then transition to CAM Walker boot for an additional 2 weeks (until on or after October 31, 2019)    Patient is getting  this weekend and was adamant about NOT getting a cast placed prior to his wedding.  We discussed the risks associated with poor outcome due to noncompliance if he weight bears or does not immobilize the joint effectively.  Patient stated he understood those risks and is willing to accept responsibility for a poor outcome including posttraumatic osteoarthritis or nonunion with the risk of possible need for surgical intervention as a result    Patient agreed to tall cam walker boot, advised nonweightbearing, he will follow-up Monday for short leg cast placement at that time.  He was placed in a tall cam walker for fracture stability         9/16/2019 9:28 AM    HISTORY/REASON FOR EXAM:  Right ankle injury      TECHNIQUE/EXAM DESCRIPTION AND NUMBER OF VIEWS:  3 views of the RIGHT ankle.    COMPARISON: None    FINDINGS:  Bone mineralization is normal.  Displaced and mildly impacted acute fracture of the posterior malleolus of the tibia is identified. There is a corticated appearing bone fragment inferior to the lateral malleolus.  There is lateral and anterior soft   tissue swelling seen. There is mildly increased distance between the lateral malleolus and the talus.        Impression       1.  Displaced mildly impacted acute fracture of the posterior malleolus is identified.    2.   Probable old fracture fragment inferior to the lateral malleolus.    3.  There appears to be mildly increased distance between the lateral malleolus and the talus which could indicate ligamentous disruption.    4.  Soft tissue swelling is noted anteriorly and laterally.        Interpreted in the office today with the patient    Thank you Nathalie Thornton P.A.-C. for allowing me to aid in caring for your patient.

## 2019-09-19 NOTE — LETTER
September 19, 2019         Patient: Roman Magana   YOB: 1990   Date of Visit: 9/19/2019           To Whom it May Concern:    Roman Magana was seen in my clinic on 9/19/2019. He has been recommended against air travel for the next 4 to 6 weeks.    If you have any questions or concerns, please don't hesitate to call.        Sincerely,           Alvaro Mendez M.D.  Electronically Signed

## 2019-09-23 ENCOUNTER — OFFICE VISIT (OUTPATIENT)
Dept: MEDICAL GROUP | Facility: CLINIC | Age: 29
End: 2019-09-23
Payer: COMMERCIAL

## 2019-09-23 VITALS — HEIGHT: 72 IN | WEIGHT: 205 LBS | BODY MASS INDEX: 27.77 KG/M2

## 2019-09-23 DIAGNOSIS — S82.871D: ICD-10-CM

## 2019-09-23 PROCEDURE — 29405 APPL SHORT LEG CAST: CPT | Performed by: FAMILY MEDICINE

## 2019-09-23 PROCEDURE — 99024 POSTOP FOLLOW-UP VISIT: CPT | Mod: 25 | Performed by: FAMILY MEDICINE

## 2019-09-23 NOTE — PROGRESS NOTES
Subjective:      Roman Magana is a 29 y.o. male who presents with Ankle Injury (F/V R ankle injury )     Referred by Nathalie Thornton P.A.-C. for evaluation of RIGHT ankle pain    HPI   RIGHT ankle pain  Date of injury Friday, September 13, 2019  Mechanism of injury, skydiving, injured while landing  Unclear mechanism of injury  Sudden sharp pain in the right ankle  Pain is predominantly at the medial aspect of the RIGHT ankle  No radiation  Improved with rest and immobilization  POSITIVE soreness and swelling  Taking Tylenol and ibuprofen for pain  Taking medication every 8 hours  POSITIVE prior issues with the RIGHT ankle, sprains and prior fracture of the RIGHT ankle, uncertain of specifics since it occurred during childhood  Occasional night symptoms    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  Regular recreational activities include hiking, camping, shooting, treadmill     Objective:     Ht 1.829 m (6')   Wt 93 kg (205 lb)   BMI 27.80 kg/m²     Physical Exam    \RIGHT ANKLE:  There is MODERATE swelling noted at the ankle  Range of motion limited to approximately 70% normal motion with dorsiflexion and plantarflexion, inversion and eversion  There is MILD tenderness of the ATFL, without tenderness of the CF or PTF ligament  There is NO tenderness of the lateral malleolus but there is POSITIVE significant tenderness of the medial malleolus and the posterior malleolus    RIGHT FOOT:  There is MILD swelling noted at the foot  There is NO tenderness at the base of the fifth metatarsal, cuboid, or tarsal navicular  There is NO pain with metatarsal squeeze test    NEUTRAL stance  Able to ambulate with nonweightbearing with crutches gait     Assessment/Plan:     1. Closed fracture of right tibial plafond without fibula involvement, with routine healing, subsequent encounter       Date of injury Friday, September 13, 2019  Mechanism of injury,  skydiving, injured while landing    Posterior malleolar fracture affecting less than 25% of the joint space (approximately 22%)  Less than 2 mm displacement/impaction   Mortise looks intact  Excellent strength with dorsiflexion, plantarflexion, inversion and eversion    All of the above findings are suggestive of EXCELLENT nonoperative treatment outcome    Fracture was stabilized in a short leg nonweightbearing cast in the office TODAY (September 23, 2019)    Recommended casting and nonweightbearing for 4 weeks (until on or after October 17, 2019) then transition to CAM Walker boot for an additional 2 weeks (until on or after October 31, 2019)        9/16/2019 9:28 AM    HISTORY/REASON FOR EXAM:  Right ankle injury      TECHNIQUE/EXAM DESCRIPTION AND NUMBER OF VIEWS:  3 views of the RIGHT ankle.    COMPARISON: None    FINDINGS:  Bone mineralization is normal.  Displaced and mildly impacted acute fracture of the posterior malleolus of the tibia is identified. There is a corticated appearing bone fragment inferior to the lateral malleolus.  There is lateral and anterior soft   tissue swelling seen. There is mildly increased distance between the lateral malleolus and the talus.        Impression       1.  Displaced mildly impacted acute fracture of the posterior malleolus is identified.    2.  Probable old fracture fragment inferior to the lateral malleolus.    3.  There appears to be mildly increased distance between the lateral malleolus and the talus which could indicate ligamentous disruption.    4.  Soft tissue swelling is noted anteriorly and laterally.        Thank you Nathalie Thornton P.A.-C. for allowing me to aid in caring for your patient.

## 2019-09-30 ENCOUNTER — OFFICE VISIT (OUTPATIENT)
Dept: MEDICAL GROUP | Facility: CLINIC | Age: 29
End: 2019-09-30
Payer: COMMERCIAL

## 2019-09-30 VITALS
BODY MASS INDEX: 27.77 KG/M2 | SYSTOLIC BLOOD PRESSURE: 116 MMHG | HEART RATE: 76 BPM | DIASTOLIC BLOOD PRESSURE: 70 MMHG | RESPIRATION RATE: 16 BRPM | WEIGHT: 205 LBS | HEIGHT: 72 IN | OXYGEN SATURATION: 97 % | TEMPERATURE: 98.2 F

## 2019-09-30 DIAGNOSIS — S82.871D: ICD-10-CM

## 2019-09-30 PROCEDURE — 99024 POSTOP FOLLOW-UP VISIT: CPT | Performed by: FAMILY MEDICINE

## 2019-09-30 NOTE — PROGRESS NOTES
Subjective:      Roman Magana is a 29 y.o. male who presents with Ankle Injury (F/V R ankle injury )     Referred by Nathalie Thornton P.A.-C. 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 Closed fracture of right tibial plafond without fibula involvement, with routine healing, subsequent encounter       Date of injury Friday, September 13, 2019  Mechanism of injury, skydiving, injured while landing    Posterior malleolar fracture affecting less than 25% of the joint space (approximately 22%)  Less than 2 mm displacement/impaction   Mortise looks intact  Excellent strength with dorsiflexion, plantarflexion, inversion and eversion    Fracture was stabilized in a short leg nonweightbearing cast (September 23, 2019)    Recommended casting and nonweightbearing for 4 weeks (until on or after October 17, 2019) then transition to CAM Walker boot for an additional 2 weeks (until on or after October 31, 2019)    Return in about 17 days (around 10/17/2019).  Patient has been advised that if cast becomes loose fitting or he has any issues with the cast he should return for a  cast change at that time, otherwise we will remove the cast on or after October 17 and transition to a tall cam walker boot        9/16/2019 9:28 AM    HISTORY/REASON FOR EXAM:  Right ankle injury      TECHNIQUE/EXAM DESCRIPTION AND NUMBER OF VIEWS:  3 views of the RIGHT ankle.    COMPARISON: None    FINDINGS:  Bone mineralization is normal.  Displaced and mildly impacted acute fracture of the posterior malleolus of the tibia is identified. There is a corticated appearing bone fragment inferior to the lateral malleolus.  There is lateral and anterior soft   tissue swelling seen. There is mildly increased distance between the lateral malleolus and the talus.        Impression       1.  Displaced mildly impacted acute fracture of the posterior malleolus is identified.    2.  Probable old fracture fragment inferior to the lateral malleolus.    3.  There appears to be mildly increased distance between the lateral malleolus and the talus which could indicate ligamentous disruption.    4.  Soft tissue swelling is noted anteriorly and laterally.        Thank you Nathalie Thornton P.A.-C. for allowing me to aid in caring for your patient.

## 2019-10-17 ENCOUNTER — APPOINTMENT (OUTPATIENT)
Dept: RADIOLOGY | Facility: IMAGING CENTER | Age: 29
End: 2019-10-17
Attending: FAMILY MEDICINE
Payer: COMMERCIAL

## 2019-10-17 ENCOUNTER — OFFICE VISIT (OUTPATIENT)
Dept: MEDICAL GROUP | Facility: CLINIC | Age: 29
End: 2019-10-17
Payer: COMMERCIAL

## 2019-10-17 VITALS — WEIGHT: 205 LBS | HEIGHT: 72 IN | BODY MASS INDEX: 27.77 KG/M2

## 2019-10-17 DIAGNOSIS — S82.871D: ICD-10-CM

## 2019-10-17 PROCEDURE — 99024 POSTOP FOLLOW-UP VISIT: CPT | Performed by: FAMILY MEDICINE

## 2019-10-17 PROCEDURE — 73610 X-RAY EXAM OF ANKLE: CPT | Mod: TC,RT | Performed by: FAMILY MEDICINE

## 2019-10-17 NOTE — LETTER
October 17, 2019         Patient: Roman Magana   YOB: 1990   Date of Visit: 10/17/2019           To Whom it May Concern:    Roman Magana was seen in my clinic on 10/17/2019. He may return to work, but recommend he wear his cam walker boot and he should not stand for greater than 50 minutes/h until reevaluation in 2 weeks.    If you have any questions or concerns, please don't hesitate to call.        Sincerely,           Alvaro Mendez M.D.  Electronically Signed

## 2019-10-17 NOTE — PROGRESS NOTES
Subjective:      Roman Magana is a 29 y.o. 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  Regular recreational activities include hiking, camping, shooting, treadmill     Objective:     Ht 1.829 m (6')   Wt 93 kg (205 lb)   BMI 27.80 kg/m²       Physical Exam    RIGHT ANKLE:  There is MILD swelling noted at the ankle  Range of motion limited to approximately 90% normal motion with dorsiflexion and plantarflexion, inversion and eversion  NO significant tenderness of the medial malleolus and the posterior malleolus  The foot and ankle is otherwise neurovascularly intact     RIGHT FOOT:  There is MILD swelling noted at the foot  There is NO tenderness at the base of the fifth metatarsal, cuboid, or tarsal navicular  There is NO pain with metatarsal squeeze test     NEUTRAL stance  Able to ambulate with nonweightbearing with crutches gait    Assessment/Plan:     1. Closed fracture of right tibial plafond without fibula involvement, with routine healing, subsequent encounter  DX-ANKLE 3+ VIEWS RIGHT     Date of injury Friday, September 13, 2019  Mechanism of injury, skydiving, injured while landing    Posterior malleolar fracture affecting less than 25% of the joint space (approximately 22%)  Less than 2 mm displacement/impaction   Mortise looks intact  Excellent strength with dorsiflexion, plantarflexion, inversion and eversion    Fracture was stabilized in a short leg nonweightbearing cast (September 23, 2019)    Recommended casting and nonweightbearing for 4 weeks (until on or after October  17, 2019)   He was transition to CAM Walker boot for an additional 2 weeks (until on or after October 31, 2019)    Return in about 2 weeks (around 10/31/2019).  To see how he is doing in his tall cam walker boot  Hopefully ready to come out of it        10/17/2019 5:08 PM    HISTORY/REASON FOR EXAM:  Pain/Deformity Following Trauma; Verify fracture stability  Follow-up right ankle fracture.    TECHNIQUE/EXAM DESCRIPTION AND NUMBER OF VIEWS:  3 views of the RIGHT ankle.    COMPARISON: 9/16/2019.    FINDINGS:  Healing mildly displaced fracture of the posterior malleolus. The fracture lucencies are still visible.    Old fracture fragment. The lateral malleolus.    No joint arthropathy.      Impression       Healing mildly displaced fracture of the posterior malleolus.             9/16/2019 9:28 AM    HISTORY/REASON FOR EXAM:  Right ankle injury      TECHNIQUE/EXAM DESCRIPTION AND NUMBER OF VIEWS:  3 views of the RIGHT ankle.    COMPARISON: None    FINDINGS:  Bone mineralization is normal.  Displaced and mildly impacted acute fracture of the posterior malleolus of the tibia is identified. There is a corticated appearing bone fragment inferior to the lateral malleolus.  There is lateral and anterior soft   tissue swelling seen. There is mildly increased distance between the lateral malleolus and the talus.        Impression       1.  Displaced mildly impacted acute fracture of the posterior malleolus is identified.    2.  Probable old fracture fragment inferior to the lateral malleolus.    3.  There appears to be mildly increased distance between the lateral malleolus and the talus which could indicate ligamentous disruption.    4.  Soft tissue swelling is noted anteriorly and laterally.        Thank you Nathalie Thornton P.A.-C. for allowing me to aid in caring for your patient.

## 2019-10-17 NOTE — LETTER
October 17, 2019         Patient: Roman Magana   YOB: 1990   Date of Visit: 10/17/2019           To Whom it May Concern:    Roman Magana was seen in my clinic on 10/17/2019. He may return to work, but recommend he wear his cam walker boot and he should not stand for greater than 15 minutes/h until reevaluation in 2 weeks.    If you have any questions or concerns, please don't hesitate to call.        Sincerely,           Alvaro Mendez M.D.  Electronically Signed

## 2019-11-04 ENCOUNTER — OFFICE VISIT (OUTPATIENT)
Dept: MEDICAL GROUP | Facility: CLINIC | Age: 29
End: 2019-11-04
Payer: COMMERCIAL

## 2019-11-04 VITALS
SYSTOLIC BLOOD PRESSURE: 114 MMHG | HEART RATE: 88 BPM | DIASTOLIC BLOOD PRESSURE: 74 MMHG | TEMPERATURE: 98.6 F | OXYGEN SATURATION: 97 % | WEIGHT: 205 LBS | BODY MASS INDEX: 27.77 KG/M2 | HEIGHT: 72 IN | RESPIRATION RATE: 16 BRPM

## 2019-11-04 DIAGNOSIS — S82.871D: ICD-10-CM

## 2019-11-04 PROCEDURE — 99024 POSTOP FOLLOW-UP VISIT: CPT | Performed by: FAMILY MEDICINE

## 2019-11-04 NOTE — LETTER
November 4, 2019         Patient: Roman Magana   YOB: 1990   Date of Visit: 11/4/2019           To Whom it May Concern:    Roman Magana was seen in my clinic on 11/4/2019. He may return to work, but recommend avoiding pushing or pulling greater than 25 pounds until Monday, November 11, 2019, then, he will be cleared to work without restriction.    If you have any questions or concerns, please don't hesitate to call.        Sincerely,           Alvaro Mendez M.D.  Electronically Signed

## 2019-11-05 NOTE — PROGRESS NOTES
Subjective:      Roman Magana is a 29 y.o. male who presents with Ankle Injury (F/V R ankle injury )    FOLLOW UP RIGHT ankle fracture    HPI   RIGHT ankle pain  Date of injury Friday, September 13, 2019  Mechanism of injury, skydiving, injured while landing  Unclear mechanism of injury  Sudden sharp pain in the right ankle  No fracture site pain in CAM Walker boot    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  Regular recreational activities include hiking, camping, shooting, treadmill     Objective:     /74 (BP Location: Left arm, Patient Position: Sitting, BP Cuff Size: Large adult)   Pulse 88   Temp 37 °C (98.6 °F) (Temporal)   Resp 16   Ht 1.829 m (6')   Wt 93 kg (205 lb)   SpO2 97%   BMI 27.80 kg/m²     Physical Exam    RIGHT ANKLE:  There is MILD swelling noted at the ankle  Range of motion limited to approximately 90% normal motion with dorsiflexion and plantarflexion, inversion and eversion  NO significant tenderness of the medial malleolus and the posterior malleolus  The foot and ankle is otherwise neurovascularly intact     NEUTRAL stance  Able to ambulate with CAM Walker boot without difficulty    Assessment/Plan:     1. Closed fracture of right tibial plafond without fibula involvement, with routine healing, subsequent encounter       Date of injury Friday, September 13, 2019  Mechanism of injury, skydiving, injured while landing    Posterior malleolar fracture affecting less than 25% of the joint space (approximately 22%)  Less than 2 mm displacement/impaction   Mortise looks intact  Excellent strength with dorsiflexion, plantarflexion, inversion and eversion    Fracture was stabilized in a short leg nonweightbearing cast (September 23, 2019)    Recommended casting and nonweightbearing for 4 weeks (until on or after October 17, 2019)   He was transition to CAM Walker boot for an additional 2 weeks (until on or after October  31, 2019)    He can discontinue CAM Walker boot and he can use a functional ankle brace for extended activity or athletic activity  He was provided with a note for work to avoid pushing greater than 25 pounds for this week, then he is cleared to work without restriction.    Follow-up as needed        10/17/2019 5:08 PM    HISTORY/REASON FOR EXAM:  Pain/Deformity Following Trauma; Verify fracture stability  Follow-up right ankle fracture.    TECHNIQUE/EXAM DESCRIPTION AND NUMBER OF VIEWS:  3 views of the RIGHT ankle.    COMPARISON: 9/16/2019.    FINDINGS:  Healing mildly displaced fracture of the posterior malleolus. The fracture lucencies are still visible.    Old fracture fragment. The lateral malleolus.    No joint arthropathy.      Impression       Healing mildly displaced fracture of the posterior malleolus.             9/16/2019 9:28 AM    HISTORY/REASON FOR EXAM:  Right ankle injury      TECHNIQUE/EXAM DESCRIPTION AND NUMBER OF VIEWS:  3 views of the RIGHT ankle.    COMPARISON: None    FINDINGS:  Bone mineralization is normal.  Displaced and mildly impacted acute fracture of the posterior malleolus of the tibia is identified. There is a corticated appearing bone fragment inferior to the lateral malleolus.  There is lateral and anterior soft   tissue swelling seen. There is mildly increased distance between the lateral malleolus and the talus.        Impression       1.  Displaced mildly impacted acute fracture of the posterior malleolus is identified.    2.  Probable old fracture fragment inferior to the lateral malleolus.    3.  There appears to be mildly increased distance between the lateral malleolus and the talus which could indicate ligamentous disruption.    4.  Soft tissue swelling is noted anteriorly and laterally.        Thank you Nathalie Thornton P.A.-C. for allowing me to aid in caring for your patient.

## 2019-12-14 ENCOUNTER — HOSPITAL ENCOUNTER (OUTPATIENT)
Dept: RADIOLOGY | Facility: MEDICAL CENTER | Age: 29
End: 2019-12-14
Attending: NEUROLOGICAL SURGERY
Payer: COMMERCIAL

## 2019-12-14 DIAGNOSIS — D33.4 BENIGN NEOPLASM OF SPINAL CORD (HCC): ICD-10-CM

## 2019-12-14 PROCEDURE — A9576 INJ PROHANCE MULTIPACK: HCPCS

## 2019-12-14 PROCEDURE — 72156 MRI NECK SPINE W/O & W/DYE: CPT

## 2019-12-14 PROCEDURE — 700117 HCHG RX CONTRAST REV CODE 255

## 2019-12-14 RX ADMIN — GADOTERIDOL 19 ML: 279.3 INJECTION, SOLUTION INTRAVENOUS at 13:05

## 2021-07-01 ENCOUNTER — TELEPHONE (OUTPATIENT)
Dept: SCHEDULING | Facility: IMAGING CENTER | Age: 31
End: 2021-07-01

## 2021-07-07 ENCOUNTER — HOSPITAL ENCOUNTER (OUTPATIENT)
Dept: LAB | Facility: MEDICAL CENTER | Age: 31
End: 2021-07-07
Attending: FAMILY MEDICINE
Payer: COMMERCIAL

## 2021-07-07 ENCOUNTER — OFFICE VISIT (OUTPATIENT)
Dept: MEDICAL GROUP | Facility: LAB | Age: 31
End: 2021-07-07
Payer: COMMERCIAL

## 2021-07-07 VITALS
OXYGEN SATURATION: 97 % | WEIGHT: 218 LBS | BODY MASS INDEX: 29.53 KG/M2 | RESPIRATION RATE: 13 BRPM | DIASTOLIC BLOOD PRESSURE: 88 MMHG | HEIGHT: 72 IN | TEMPERATURE: 97.9 F | SYSTOLIC BLOOD PRESSURE: 118 MMHG | HEART RATE: 78 BPM

## 2021-07-07 DIAGNOSIS — Z23 NEED FOR VACCINATION: ICD-10-CM

## 2021-07-07 DIAGNOSIS — K52.9 CHRONIC DIARRHEA: ICD-10-CM

## 2021-07-07 DIAGNOSIS — Z76.89 ENCOUNTER TO ESTABLISH CARE: ICD-10-CM

## 2021-07-07 LAB
ALBUMIN SERPL BCP-MCNC: 4.2 G/DL (ref 3.2–4.9)
ALBUMIN/GLOB SERPL: 1.7 G/DL
ALP SERPL-CCNC: 72 U/L (ref 30–99)
ALT SERPL-CCNC: 92 U/L (ref 2–50)
ANION GAP SERPL CALC-SCNC: 12 MMOL/L (ref 7–16)
AST SERPL-CCNC: 37 U/L (ref 12–45)
BILIRUB SERPL-MCNC: 0.6 MG/DL (ref 0.1–1.5)
BUN SERPL-MCNC: 12 MG/DL (ref 8–22)
CALCIUM SERPL-MCNC: 8.9 MG/DL (ref 8.5–10.5)
CHLORIDE SERPL-SCNC: 104 MMOL/L (ref 96–112)
CO2 SERPL-SCNC: 24 MMOL/L (ref 20–33)
CREAT SERPL-MCNC: 1.03 MG/DL (ref 0.5–1.4)
ERYTHROCYTE [DISTWIDTH] IN BLOOD BY AUTOMATED COUNT: 41.2 FL (ref 35.9–50)
GLOBULIN SER CALC-MCNC: 2.5 G/DL (ref 1.9–3.5)
GLUCOSE SERPL-MCNC: 97 MG/DL (ref 65–99)
HCT VFR BLD AUTO: 46.8 % (ref 42–52)
HGB BLD-MCNC: 16.5 G/DL (ref 14–18)
MCH RBC QN AUTO: 32.2 PG (ref 27–33)
MCHC RBC AUTO-ENTMCNC: 35.3 G/DL (ref 33.7–35.3)
MCV RBC AUTO: 91.4 FL (ref 81.4–97.8)
PLATELET # BLD AUTO: 130 K/UL (ref 164–446)
PMV BLD AUTO: 12.8 FL (ref 9–12.9)
POTASSIUM SERPL-SCNC: 4 MMOL/L (ref 3.6–5.5)
PROT SERPL-MCNC: 6.7 G/DL (ref 6–8.2)
RBC # BLD AUTO: 5.12 M/UL (ref 4.7–6.1)
SODIUM SERPL-SCNC: 140 MMOL/L (ref 135–145)
TSH SERPL DL<=0.005 MIU/L-ACNC: 1.23 UIU/ML (ref 0.38–5.33)
WBC # BLD AUTO: 5.7 K/UL (ref 4.8–10.8)

## 2021-07-07 PROCEDURE — 84443 ASSAY THYROID STIM HORMONE: CPT

## 2021-07-07 PROCEDURE — 85027 COMPLETE CBC AUTOMATED: CPT

## 2021-07-07 PROCEDURE — 90471 IMMUNIZATION ADMIN: CPT | Performed by: FAMILY MEDICINE

## 2021-07-07 PROCEDURE — 99214 OFFICE O/P EST MOD 30 MIN: CPT | Mod: 25 | Performed by: FAMILY MEDICINE

## 2021-07-07 PROCEDURE — 90715 TDAP VACCINE 7 YRS/> IM: CPT | Performed by: FAMILY MEDICINE

## 2021-07-07 PROCEDURE — 83516 IMMUNOASSAY NONANTIBODY: CPT

## 2021-07-07 PROCEDURE — 36415 COLL VENOUS BLD VENIPUNCTURE: CPT

## 2021-07-07 PROCEDURE — 80053 COMPREHEN METABOLIC PANEL: CPT

## 2021-07-07 ASSESSMENT — ENCOUNTER SYMPTOMS
VOMITING: 0
WHEEZING: 0
CHILLS: 0
FEVER: 0
SHORTNESS OF BREATH: 0
PALPITATIONS: 0
ABDOMINAL PAIN: 0
NAUSEA: 1
WEIGHT LOSS: 0
BLOOD IN STOOL: 0
DIARRHEA: 1
HEARTBURN: 0
CONSTIPATION: 0

## 2021-07-07 ASSESSMENT — PATIENT HEALTH QUESTIONNAIRE - PHQ9
SUM OF ALL RESPONSES TO PHQ QUESTIONS 1-9: 3
5. POOR APPETITE OR OVEREATING: 0 - NOT AT ALL
CLINICAL INTERPRETATION OF PHQ2 SCORE: 1

## 2021-07-07 NOTE — PROGRESS NOTES
Subjective:   Roman Magana is a 31 y.o. male here today for   Chief Complaint   Patient presents with   • Establish Care   • GI Problem     X 2 years, cant pin point exactly what is casuing it        #Establish care   -Reviewed all past medical history, family history, social history.  -Reviewed all screening/vaccinations: Patient is due for Tdap vaccine, labs.  -Diet and Exercise: Working on improving diet and exercise regimen.  -Tobacco, alcohol, recreational drug use: 5-6 alcohol drinks per week.  Remote use of marijuana.  No other tobacco, recreational illicit drug use.  -Sexually active: In August with female partner, no concerns regarding relationship  -Occupation: Supervisor [employer]    #GI  -Onset 1-2 years  -Having difficilty with urgency with bowel movement/diarrhea. Symptoms usually are worse in the mornings.   -Constant diarrhea that is occurring, according to patient, 90% of the time.  -He has not noticed any changes or worsening/improvement of symptoms with changes in diet.  Has gone elimination diet for lactose, other items that has not made any difference either.  -He does state that he has seen more and added stress in his new job over the last year but has not correlated increased stress with increase of symptoms.  -Denies any abdominal pain, blood in stool, black dark or tarry looking stools.  -Pertinent family history includes second-degree relative with colon cancer, first-degree relative with thyroid cancer.    No Known Allergies      Current medicines (including changes today)  Current Outpatient Medications   Medication Sig Dispense Refill   • ibuprofen (MOTRIN) 200 MG Tab Take 200 mg by mouth every 6 hours as needed.     • acetaminophen (TYLENOL) 500 MG Tab Take 500-1,000 mg by mouth every 6 hours as needed.       No current facility-administered medications for this visit.     He  has no past medical history on file.    ROS   Review of Systems   Constitutional: Negative  for chills, fever and weight loss.   Respiratory: Negative for shortness of breath and wheezing.    Cardiovascular: Negative for chest pain and palpitations.   Gastrointestinal: Positive for diarrhea and nausea. Negative for abdominal pain, blood in stool, constipation, heartburn, melena and vomiting.      Objective:     Physical Exam:  /88   Pulse 78   Temp 36.6 °C (97.9 °F) (Temporal)   Resp 13   Ht 1.829 m (6')   Wt 98.9 kg (218 lb)   SpO2 97%  Body mass index is 29.57 kg/m².   Constitutional: Alert, no distress.  Skin: Warm, dry, good turgor, no rashes in visible areas.  Eye: Equal, round and reactive, conjunctiva clear, lids normal.  ENMT: TM's clear bilaterally, lips without lesions, good dentition, oropharynx clear.  Neck: Trachea midline, no masses, no thyromegaly. No cervical or supraclavicular lymphadenopathy.  Respiratory: Unlabored respiratory effort, lungs clear to auscultation, no wheezes, no rhonchi.  Cardiovascular: Normal S1, S2, no murmur, no edema.  Abdomen: Soft, non-tender, no masses, no hepatosplenomegaly.  Psych: Alert and oriented x3, normal affect and mood.    Assessment and Plan:     1. Encounter to establish care  -All questions concerns were answered at this time.  -Vaccinations/screenings needed at this time: We will complete Tdap, labs as below.  -Labs reviewed, no concerns, check labs as below.  -Discussed the importance of a healthy, Mediterranean style diet, routine exercise regimen.  -Discussed general safety measures including seatbelts, helmets, avoidance of smoking, sunscreen, hydration.  -Follow-up for general physical exam on a yearly basis, sooner if needed.  - CBC WITHOUT DIFFERENTIAL; Future  - Comp Metabolic Panel; Future    2. Chronic diarrhea  -Uncertain etiology of diarrhea at this time.  Will check transglutaminase antibodies and labs as above.  Discussed starting food diary to see if there is any correlation between certain foods and symptoms.  -Discussed  conservative treat measures with loperamide as needed.  -Given the chronicity as well as unusual presentation of symptoms I do recommend the patient be followed up by specialist.  Will refer to gastroenterology at this time.  - REFERRAL TO GASTROENTEROLOGY  - TRANSGLUTAMINASE ANTIBODIES; Future  - TSH WITH REFLEX TO FT4; Future    3. Need for vaccination  Patient was agreeable to receiving the tdap vaccine in clinic today after discussion of potential risks, benefits, and side effects. Vaccine was administered without adverse effects.  - Tdap Vaccine =>6YO IM      Followup: Return in about 1 year (around 7/7/2022), or if symptoms worsen or fail to improve.         PLEASE NOTE: This dictation was created using voice recognition software. I have made every reasonable attempt to correct obvious errors, but I expect that there are errors of grammar and possibly content that I did not discover before finalizing the note.

## 2021-07-09 DIAGNOSIS — R74.8 ELEVATED LIVER ENZYMES: ICD-10-CM

## 2021-07-09 DIAGNOSIS — D69.6 THROMBOCYTOPENIA (HCC): ICD-10-CM

## 2021-07-09 LAB
TTG IGA SER IA-ACNC: <2 U/ML (ref 0–3)
TTG IGG SER IA-ACNC: 4 U/ML (ref 0–5)

## 2022-07-07 ENCOUNTER — OFFICE VISIT (OUTPATIENT)
Dept: MEDICAL GROUP | Facility: LAB | Age: 32
End: 2022-07-07
Payer: COMMERCIAL

## 2022-07-07 VITALS
SYSTOLIC BLOOD PRESSURE: 114 MMHG | BODY MASS INDEX: 29.66 KG/M2 | OXYGEN SATURATION: 99 % | HEART RATE: 79 BPM | DIASTOLIC BLOOD PRESSURE: 76 MMHG | TEMPERATURE: 97.6 F | HEIGHT: 72 IN | WEIGHT: 219 LBS | RESPIRATION RATE: 15 BRPM

## 2022-07-07 DIAGNOSIS — R74.8 ELEVATED LIVER ENZYMES: ICD-10-CM

## 2022-07-07 DIAGNOSIS — H93.12 TINNITUS OF LEFT EAR: ICD-10-CM

## 2022-07-07 DIAGNOSIS — M25.561 CHRONIC PAIN OF RIGHT KNEE: ICD-10-CM

## 2022-07-07 DIAGNOSIS — D69.6 THROMBOCYTOPENIA (HCC): ICD-10-CM

## 2022-07-07 DIAGNOSIS — Z00.00 ANNUAL PHYSICAL EXAM: ICD-10-CM

## 2022-07-07 DIAGNOSIS — G89.29 CHRONIC PAIN OF RIGHT KNEE: ICD-10-CM

## 2022-07-07 PROCEDURE — 99395 PREV VISIT EST AGE 18-39: CPT | Performed by: FAMILY MEDICINE

## 2022-07-07 ASSESSMENT — ENCOUNTER SYMPTOMS
FEVER: 0
DIARRHEA: 0
PALPITATIONS: 0
SHORTNESS OF BREATH: 0
NERVOUS/ANXIOUS: 0
NAUSEA: 0
CONSTIPATION: 0
WHEEZING: 0
DEPRESSION: 0
ABDOMINAL PAIN: 0
CHILLS: 0
VOMITING: 0
BLURRED VISION: 0

## 2022-07-07 ASSESSMENT — PATIENT HEALTH QUESTIONNAIRE - PHQ9: CLINICAL INTERPRETATION OF PHQ2 SCORE: 0

## 2022-07-07 ASSESSMENT — FIBROSIS 4 INDEX: FIB4 SCORE: 1.01

## 2022-07-07 NOTE — PROGRESS NOTES
Subjective:   Roman Magana is a 32 y.o. male here today for   Chief Complaint   Patient presents with   • Annual Exam     #Annual   -Reviewed all past medical history, family history, social history.  -Reviewed all screening/vaccinations: COVID-19 booster  -Diet and Exercise: appropriate for age. Improved diet, eating healthy. Exercise has been decreased due to new kid. Staying active   -Tobacco, alcohol, recreational drug use: alcohol use: 6-10 beers a weeks. No smoking, drugs.   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   -Sexually active: Monogamous with female partner.    #Right knee pain   -Ongoing right knee pain over the last couple of weeks.  He states that his intermittent knee, most prominent when sitting, with knee flexion, for long periods of time.  Denies any acute trauma, previous injury.    #Tinnitus in left ear:  -Chronic longstanding condition which developed after serving time in the [employer].  No changes, continues to have some loss of hearing but overall stable.    No Known Allergies      Current medicines (including changes today)  Current Outpatient Medications   Medication Sig Dispense Refill   • ibuprofen (MOTRIN) 200 MG Tab Take 200 mg by mouth every 6 hours as needed.     • acetaminophen (TYLENOL) 500 MG Tab Take 500-1,000 mg by mouth every 6 hours as needed.       No current facility-administered medications for this visit.     He  has a past medical history of Migraine.    ROS   Review of Systems   Constitutional: Negative for chills and fever.   HENT: Negative for hearing loss.    Eyes: Negative for blurred vision.   Respiratory: Negative for shortness of breath and wheezing.    Cardiovascular: Negative for chest pain and palpitations.   Gastrointestinal: Negative for abdominal pain, constipation, diarrhea, nausea and vomiting.   Psychiatric/Behavioral: Negative for depression. The patient is not nervous/anxious.           Objective:     Physical Exam:  /76   Pulse 79    Temp 36.4 °C (97.6 °F) (Temporal)   Resp 15   Ht 1.829 m (6')   Wt 99.3 kg (219 lb)   SpO2 99%  Body mass index is 29.7 kg/m².   Constitutional: Alert, no distress.  Skin: Warm, dry, good turgor, no rashes in visible areas.  Eye: Equal, round and reactive, conjunctiva clear, lids normal.  ENMT: TM's clear bilaterally, lips without lesions, good dentition, oropharynx clear.  Neck: Trachea midline, no masses, no thyromegaly. No cervical or supraclavicular lymphadenopathy.  Respiratory: Unlabored respiratory effort, lungs clear to auscultation, no wheezes, no rhonchi.  Cardiovascular: Normal S1, S2, no murmur, no edema.  Abdomen: Soft, non-tender, no masses, no hepatosplenomegaly.  Psych: Alert and oriented x3, normal affect and mood.    Evaluated gait, posture, weightbearing status. Examination of Right knee:  -    Inspected/palpated bilaterally for warmth, erythema/discoloration, effusion/swelling, patellar malalignment/apprehension, and tenderness of the medial/lateral joint lines, patella, distal femur, proximal tibia/fibula, quadriceps/patellar tendon, IT band, LCL/MCL, and pes anserine bursa.  -    Assessed passive/active range of motion bilaterally in flexion and extension, noting below for pain or crepitation.  -    Assessed muscle strength bilaterally in flexion and extension, noting below if less than 5/5 or pain. Observed for muscle atrophy.  -    Assessed stability bilaterally at ACL (Lachman, anterior drawer), PCL (posterior drawer), LCL (varus stress), MCL (valgus stress), menisci (Thessaly, Apley, Shay).       All of the above were found to be normal, except:  Slightly positive Thessaly maneuver on right knee.    Assessment and Plan:     1. Annual physical exam  -All questions concerns were answered at this time.  -Vaccinations/screenings needed at this time: We will follow-up for COVID-19 booster.  -Labs reviewed, no concerns, check labs as below.  -Discussed the importance of a healthy,  Mediterranean style diet, routine exercise regimen.  -Discussed general safety measures including seatbelts, helmets, avoidance of smoking, sunscreen, hydration.  -Follow-up for general physical exam on a yearly basis, sooner if needed.    2. Tinnitus of left ear  -Stable with no concerns.  We will continue to follow.    3. Chronic pain of right knee  -At this time most likely slight meniscal damage.  Discussed conservative treatment measures, patient will follow-up as needed.    4. Elevated liver enzymes  -Seen on previous lab work.  We will recheck to assure normalization.  - Comp Metabolic Panel; Future    5. Thrombocytopenia (HCC)  -Seen on previous lab work.  Recheck to assure normalization.  - CBC WITHOUT DIFFERENTIAL; Future      Followup: No follow-ups on file.         PLEASE NOTE: This dictation was created using voice recognition software. I have made every reasonable attempt to correct obvious errors, but I expect that there are errors of grammar and possibly content that I did not discover before finalizing the note.

## 2022-10-28 ENCOUNTER — PATIENT MESSAGE (OUTPATIENT)
Dept: MEDICAL GROUP | Facility: LAB | Age: 32
End: 2022-10-28
Payer: COMMERCIAL

## 2022-10-28 DIAGNOSIS — I86.1 VARICOCELE: ICD-10-CM

## 2022-10-28 DIAGNOSIS — N43.3 HYDROCELE, UNSPECIFIED HYDROCELE TYPE: ICD-10-CM

## 2022-10-31 ENCOUNTER — OFFICE VISIT (OUTPATIENT)
Dept: MEDICAL GROUP | Facility: LAB | Age: 32
End: 2022-10-31
Payer: COMMERCIAL

## 2022-10-31 VITALS
BODY MASS INDEX: 29.66 KG/M2 | SYSTOLIC BLOOD PRESSURE: 112 MMHG | HEIGHT: 72 IN | RESPIRATION RATE: 15 BRPM | HEART RATE: 68 BPM | WEIGHT: 219 LBS | DIASTOLIC BLOOD PRESSURE: 80 MMHG | TEMPERATURE: 98 F | OXYGEN SATURATION: 96 %

## 2022-10-31 DIAGNOSIS — N50.82 PAIN IN SCROTUM: ICD-10-CM

## 2022-10-31 DIAGNOSIS — M79.10 GENERALIZED MUSCLE ACHE: ICD-10-CM

## 2022-10-31 PROCEDURE — 99214 OFFICE O/P EST MOD 30 MIN: CPT | Performed by: FAMILY MEDICINE

## 2022-10-31 ASSESSMENT — FIBROSIS 4 INDEX: FIB4 SCORE: 1.01

## 2022-10-31 NOTE — PROGRESS NOTES
Subjective:   Roman Magana is a 32 y.o. male here today for   Chief Complaint   Patient presents with    Groin Pain     X noticing a of other symptoms, Muscle weakness,  strength in hands is weak, Lauric      #Pain in scrotum   -Patient started noting pain in scrotum. Located on left side. Described as a dull aching pain, non radiating.   -Pain has been ongoing for the last 3 days.   -He stated that today the pain is improved but has began having body aches (see below).  -Patient denies any hematuria, dysuria, testicular swelling, trauma.  No new sexual partners, no concerns for STDs.  These symptoms are new to patient, has never experienced them previously.    #Body aches   -Patient states that last night, after experiencing pain in the scrotum, the pain in scrotum started to receded; however, he had noticed significant pain in his arms and legs.  Describes as a dull, throbbing pain.  He experiences mostly in his hands, especially his left hand.  He states this has caused some weakness in his left hand  as well.  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  Again, patient denies any new trauma.  Denies any chest pain, shortness of breath.  Denies any night sweats, weight loss.  Has experiencing chills but denies any fevers.  Treating with paroxetine helping slightly.      No Known Allergies      Current medicines (including changes today)  Current Outpatient Medications   Medication Sig Dispense Refill    ibuprofen (MOTRIN) 200 MG Tab Take 200 mg by mouth every 6 hours as needed.      acetaminophen (TYLENOL) 500 MG Tab Take 500-1,000 mg by mouth every 6 hours as needed.       No current facility-administered medications for this visit.     He  has a past medical history of Migraine.    ROS   -See HPI       Objective:     Physical Exam:  /80   Pulse 68   Temp 36.7 °C (98  "°F) (Temporal)   Resp 15   Ht 1.829 m (6' 0.01\")   Wt 99.3 kg (219 lb)   SpO2 96%  Body mass index is 29.7 kg/m².   Constitutional: Alert, no distress.  Skin: Warm, dry, good turgor, no rashes in visible areas.  Eye: Equal, round and reactive, conjunctiva clear, lids normal.  Neck: Trachea midline, no masses, no thyromegaly. No cervical or supraclavicular lymphadenopathy.  Respiratory: Unlabored respiratory effort, lungs clear to auscultation, no wheezes, no rhonchi.  Cardiovascular: Normal S1, S2, no murmur, no edema.  Abdomen: Soft, non-tender, no masses, no hepatosplenomegaly.  Psych: Alert and oriented x3, normal affect and mood.  : Normal appearance on exam of scrotum.  Physical exam showed slightly increased epididymis on the left side with no tenderness to palpation.  No hernia noted on left or right hand side.  No rashes or lesions noted.    Assessment and Plan:     1. Pain in scrotum  -Uncertain etiology of pain in scrotum.  Physical examination to suggest the possibility of varicocele.  No signs of hernia at this time.  We will check ultrasound for further evaluation.  Strict return precautions were given, patient will follow-up as needed.  - [id number]; Future    2. Generalized muscle ache  Uncertain etiology of muscle aches.  Patient does have some history that is concerning for the left arm weakness including history of cervical spine tumor but this does appear to be more generalized.  Because these findings we will check labs including creatinine kinase.  Patient will continue to monitor symptoms carefully and if they do worsen we may need to refer for EMG or an urgent referral to neurology.  We will follow patient with results.  Sooner if needed.  - CBC WITHOUT DIFFERENTIAL; Future  - Comp Metabolic Panel; Future  - CREATINE KINASE; Future      Followup: No follow-ups on file.         PLEASE NOTE: This dictation was created using voice recognition software. I have made every reasonable " attempt to correct obvious errors, but I expect that there are errors of grammar and possibly content that I did not discover before finalizing the note.

## 2022-11-01 ENCOUNTER — HOSPITAL ENCOUNTER (OUTPATIENT)
Dept: RADIOLOGY | Facility: MEDICAL CENTER | Age: 32
End: 2022-11-01
Attending: FAMILY MEDICINE
Payer: COMMERCIAL

## 2022-11-01 ENCOUNTER — HOSPITAL ENCOUNTER (OUTPATIENT)
Dept: LAB | Facility: MEDICAL CENTER | Age: 32
End: 2022-11-01
Attending: FAMILY MEDICINE
Payer: COMMERCIAL

## 2022-11-01 DIAGNOSIS — N50.82 PAIN IN SCROTUM: ICD-10-CM

## 2022-11-01 DIAGNOSIS — M79.10 GENERALIZED MUSCLE ACHE: ICD-10-CM

## 2022-11-01 LAB
ALBUMIN SERPL BCP-MCNC: 4.4 G/DL (ref 3.2–4.9)
ALBUMIN/GLOB SERPL: 1.7 G/DL
ALP SERPL-CCNC: 78 U/L (ref 30–99)
ALT SERPL-CCNC: 84 U/L (ref 2–50)
ANION GAP SERPL CALC-SCNC: 9 MMOL/L (ref 7–16)
AST SERPL-CCNC: 49 U/L (ref 12–45)
BILIRUB SERPL-MCNC: 0.3 MG/DL (ref 0.1–1.5)
BUN SERPL-MCNC: 15 MG/DL (ref 8–22)
CALCIUM SERPL-MCNC: 9.4 MG/DL (ref 8.5–10.5)
CHLORIDE SERPL-SCNC: 106 MMOL/L (ref 96–112)
CK SERPL-CCNC: 342 U/L (ref 0–154)
CO2 SERPL-SCNC: 26 MMOL/L (ref 20–33)
CREAT SERPL-MCNC: 1.12 MG/DL (ref 0.5–1.4)
ERYTHROCYTE [DISTWIDTH] IN BLOOD BY AUTOMATED COUNT: 43.3 FL (ref 35.9–50)
GFR SERPLBLD CREATININE-BSD FMLA CKD-EPI: 88 ML/MIN/1.73 M 2
GLOBULIN SER CALC-MCNC: 2.6 G/DL (ref 1.9–3.5)
GLUCOSE SERPL-MCNC: 105 MG/DL (ref 65–99)
HCT VFR BLD AUTO: 49.1 % (ref 42–52)
HGB BLD-MCNC: 16.9 G/DL (ref 14–18)
MCH RBC QN AUTO: 32.2 PG (ref 27–33)
MCHC RBC AUTO-ENTMCNC: 34.4 G/DL (ref 33.7–35.3)
MCV RBC AUTO: 93.5 FL (ref 81.4–97.8)
PLATELET # BLD AUTO: 152 K/UL (ref 164–446)
PMV BLD AUTO: 12.7 FL (ref 9–12.9)
POTASSIUM SERPL-SCNC: 4.2 MMOL/L (ref 3.6–5.5)
PROT SERPL-MCNC: 7 G/DL (ref 6–8.2)
RBC # BLD AUTO: 5.25 M/UL (ref 4.7–6.1)
SODIUM SERPL-SCNC: 141 MMOL/L (ref 135–145)
WBC # BLD AUTO: 4.7 K/UL (ref 4.8–10.8)

## 2022-11-01 PROCEDURE — 85027 COMPLETE CBC AUTOMATED: CPT

## 2022-11-01 PROCEDURE — 80053 COMPREHEN METABOLIC PANEL: CPT

## 2022-11-01 PROCEDURE — 82550 ASSAY OF CK (CPK): CPT

## 2022-11-01 PROCEDURE — 76870 US EXAM SCROTUM: CPT

## 2022-11-01 PROCEDURE — 36415 COLL VENOUS BLD VENIPUNCTURE: CPT

## 2023-05-26 ENCOUNTER — OFFICE VISIT (OUTPATIENT)
Dept: URGENT CARE | Facility: PHYSICIAN GROUP | Age: 33
End: 2023-05-26
Payer: COMMERCIAL

## 2023-05-26 VITALS
TEMPERATURE: 98.4 F | RESPIRATION RATE: 18 BRPM | SYSTOLIC BLOOD PRESSURE: 122 MMHG | WEIGHT: 220 LBS | HEART RATE: 87 BPM | HEIGHT: 72 IN | OXYGEN SATURATION: 97 % | BODY MASS INDEX: 29.8 KG/M2 | DIASTOLIC BLOOD PRESSURE: 88 MMHG

## 2023-05-26 DIAGNOSIS — J02.9 SORE THROAT: ICD-10-CM

## 2023-05-26 DIAGNOSIS — J06.9 VIRAL URI WITH COUGH: Primary | ICD-10-CM

## 2023-05-26 LAB
FLUAV RNA SPEC QL NAA+PROBE: NEGATIVE
FLUBV RNA SPEC QL NAA+PROBE: NEGATIVE
RSV RNA SPEC QL NAA+PROBE: NEGATIVE
S PYO DNA SPEC NAA+PROBE: NOT DETECTED
SARS-COV-2 RNA RESP QL NAA+PROBE: NEGATIVE

## 2023-05-26 PROCEDURE — 3079F DIAST BP 80-89 MM HG: CPT | Performed by: FAMILY MEDICINE

## 2023-05-26 PROCEDURE — 0241U POCT CEPHEID COV-2, FLU A/B, RSV - PCR: CPT | Performed by: FAMILY MEDICINE

## 2023-05-26 PROCEDURE — 99213 OFFICE O/P EST LOW 20 MIN: CPT | Performed by: FAMILY MEDICINE

## 2023-05-26 PROCEDURE — 87651 STREP A DNA AMP PROBE: CPT | Performed by: FAMILY MEDICINE

## 2023-05-26 PROCEDURE — 3074F SYST BP LT 130 MM HG: CPT | Performed by: FAMILY MEDICINE

## 2023-05-26 RX ORDER — BENZONATATE 200 MG/1
200 CAPSULE ORAL 3 TIMES DAILY PRN
Qty: 30 CAPSULE | Refills: 0 | Status: SHIPPED | OUTPATIENT
Start: 2023-05-26

## 2023-05-26 ASSESSMENT — ENCOUNTER SYMPTOMS
COUGH: 1
SORE THROAT: 1

## 2023-05-26 ASSESSMENT — FIBROSIS 4 INDEX: FIB4 SCORE: 1.23

## 2023-05-26 NOTE — LETTER
May 26, 2023         Patient: Roman Magana   YOB: 1990   Date of Visit: 5/26/2023           To Whom it May Concern:    Roman Magana was seen in my clinic on 5/26/2023. He may return to work in 2-3 days.    If you have any questions or concerns, please don't hesitate to call.        Sincerely,           Soy Stringer M.D.  Electronically Signed

## 2023-05-26 NOTE — PROGRESS NOTES
Subjective     Roman Magana is a 33 y.o. male who presents with Cough (Sore throat, lightheaded, fever x 3 days )      - This is a pleasant and nontoxic appearing 33 y.o. male who has come to the walk-in clinic today for:    #1) ~3 days w/ aches, malaise, sore throat and cough w/ occasional non bloody sputum along w/ headaches and fevers on days 1-2.       ALLERGIES:  Patient has no known allergies.     PMH:  Past Medical History:   Diagnosis Date    Migraine         PSH:  Past Surgical History:   Procedure Laterality Date    LUMBAR LAMINECTOMY DISKECTOMY  2018    Benign mass excision     APPENDECTOMY         MEDS:    Current Outpatient Medications:     benzonatate (TESSALON) 200 MG capsule, Take 1 Capsule by mouth 3 times a day as needed for Cough., Disp: 30 Capsule, Rfl: 0    ibuprofen (MOTRIN) 200 MG Tab, Take 200 mg by mouth every 6 hours as needed., Disp: , Rfl:     acetaminophen (TYLENOL) 500 MG Tab, Take 500-1,000 mg by mouth every 6 hours as needed., Disp: , Rfl:     ** I have documented what I find to be significant in regards to past medical, social, family and surgical history  in my HPI or under PMH/PSH/FH review section, otherwise it is noncontributory **           HPI    Review of Systems   HENT:  Positive for sore throat.    Respiratory:  Positive for cough.    All other systems reviewed and are negative.             Objective     /88   Pulse 87   Temp 36.9 °C (98.4 °F) (Temporal)   Resp 18   Ht 1.829 m (6')   Wt 99.8 kg (220 lb)   SpO2 97%   BMI 29.84 kg/m²      Physical Exam  Vitals and nursing note reviewed.   Constitutional:       General: He is not in acute distress.     Appearance: Normal appearance. He is well-developed.   HENT:      Head: Normocephalic.      Mouth/Throat:      Mouth: Mucous membranes are moist.      Pharynx: Oropharynx is clear. Posterior oropharyngeal erythema present. No oropharyngeal exudate.   Cardiovascular:      Heart sounds: Normal heart sounds. No  murmur heard.  Pulmonary:      Effort: Pulmonary effort is normal. No respiratory distress.      Breath sounds: Normal breath sounds. No wheezing, rhonchi or rales.   Neurological:      Mental Status: He is alert.      Motor: No abnormal muscle tone.   Psychiatric:         Mood and Affect: Mood normal.         Behavior: Behavior normal.                             Assessment & Plan     1. Viral URI with cough  benzonatate (TESSALON) 200 MG capsule      2. Sore throat  POCT GROUP A STREP, PCR    POCT CoV-2, Flu A/B, RSV by PCR          - Dx, plan & d/c instructions discussed   - Rest, stay hydrated  - OTC Motrin and/or Tylenol as needed      Follow up with your regular primary care providers office for a recheck on today's visit. ER if not improving in 2-3 days or if feeling/getting worse.     Any realistic side effects of medications that may have been given today reviewed.     Patient left in stable condition     POCT results reviewed/discussed    Pertinent prior office visit notes in Epic have been reviewed by me today on day of this visit.

## 2024-06-15 ENCOUNTER — ANESTHESIA (OUTPATIENT)
Dept: SURGERY | Facility: MEDICAL CENTER | Age: 34
End: 2024-06-15
Payer: COMMERCIAL

## 2024-06-15 ENCOUNTER — OFFICE VISIT (OUTPATIENT)
Dept: URGENT CARE | Facility: PHYSICIAN GROUP | Age: 34
End: 2024-06-15
Payer: COMMERCIAL

## 2024-06-15 ENCOUNTER — HOSPITAL ENCOUNTER (OUTPATIENT)
Facility: MEDICAL CENTER | Age: 34
End: 2024-06-16
Attending: EMERGENCY MEDICINE | Admitting: STUDENT IN AN ORGANIZED HEALTH CARE EDUCATION/TRAINING PROGRAM
Payer: COMMERCIAL

## 2024-06-15 ENCOUNTER — ANESTHESIA EVENT (OUTPATIENT)
Dept: SURGERY | Facility: MEDICAL CENTER | Age: 34
End: 2024-06-15
Payer: COMMERCIAL

## 2024-06-15 ENCOUNTER — APPOINTMENT (OUTPATIENT)
Dept: RADIOLOGY | Facility: MEDICAL CENTER | Age: 34
End: 2024-06-15
Attending: EMERGENCY MEDICINE
Payer: COMMERCIAL

## 2024-06-15 VITALS
RESPIRATION RATE: 16 BRPM | HEART RATE: 72 BPM | TEMPERATURE: 98.4 F | WEIGHT: 220 LBS | SYSTOLIC BLOOD PRESSURE: 116 MMHG | OXYGEN SATURATION: 98 % | HEIGHT: 72 IN | DIASTOLIC BLOOD PRESSURE: 84 MMHG | BODY MASS INDEX: 29.8 KG/M2

## 2024-06-15 DIAGNOSIS — Z87.828 HISTORY OF NECK INJURY: ICD-10-CM

## 2024-06-15 DIAGNOSIS — N49.2 SCROTAL ABSCESS: ICD-10-CM

## 2024-06-15 DIAGNOSIS — L02.214 ABSCESS OF GROIN, LEFT: ICD-10-CM

## 2024-06-15 PROBLEM — R74.01 TRANSAMINITIS: Status: ACTIVE | Noted: 2024-06-15

## 2024-06-15 PROBLEM — D69.6 THROMBOCYTOPENIA (HCC): Status: ACTIVE | Noted: 2024-06-15

## 2024-06-15 LAB
ALBUMIN SERPL BCP-MCNC: 4.3 G/DL (ref 3.2–4.9)
ALBUMIN/GLOB SERPL: 1.7 G/DL
ALP SERPL-CCNC: 70 U/L (ref 30–99)
ALT SERPL-CCNC: 79 U/L (ref 2–50)
ANION GAP SERPL CALC-SCNC: 12 MMOL/L (ref 7–16)
APPEARANCE UR: CLEAR
AST SERPL-CCNC: 36 U/L (ref 12–45)
BASOPHILS # BLD AUTO: 0.8 % (ref 0–1.8)
BASOPHILS # BLD: 0.06 K/UL (ref 0–0.12)
BILIRUB SERPL-MCNC: 0.5 MG/DL (ref 0.1–1.5)
BILIRUB UR QL STRIP.AUTO: NEGATIVE
BUN SERPL-MCNC: 10 MG/DL (ref 8–22)
CALCIUM ALBUM COR SERPL-MCNC: 8.5 MG/DL (ref 8.5–10.5)
CALCIUM SERPL-MCNC: 8.7 MG/DL (ref 8.5–10.5)
CHLORIDE SERPL-SCNC: 106 MMOL/L (ref 96–112)
CO2 SERPL-SCNC: 24 MMOL/L (ref 20–33)
COLOR UR: YELLOW
CREAT SERPL-MCNC: 1.03 MG/DL (ref 0.5–1.4)
EOSINOPHIL # BLD AUTO: 0.33 K/UL (ref 0–0.51)
EOSINOPHIL NFR BLD: 4.4 % (ref 0–6.9)
ERYTHROCYTE [DISTWIDTH] IN BLOOD BY AUTOMATED COUNT: 41.8 FL (ref 35.9–50)
GFR SERPLBLD CREATININE-BSD FMLA CKD-EPI: 96 ML/MIN/1.73 M 2
GLOBULIN SER CALC-MCNC: 2.5 G/DL (ref 1.9–3.5)
GLUCOSE SERPL-MCNC: 96 MG/DL (ref 65–99)
GLUCOSE UR STRIP.AUTO-MCNC: NEGATIVE MG/DL
GRAM STN SPEC: NORMAL
HCT VFR BLD AUTO: 46.2 % (ref 42–52)
HGB BLD-MCNC: 16.2 G/DL (ref 14–18)
IMM GRANULOCYTES # BLD AUTO: 0.02 K/UL (ref 0–0.11)
IMM GRANULOCYTES NFR BLD AUTO: 0.3 % (ref 0–0.9)
KETONES UR STRIP.AUTO-MCNC: NEGATIVE MG/DL
LACTATE SERPL-SCNC: 1.1 MMOL/L (ref 0.5–2)
LEUKOCYTE ESTERASE UR QL STRIP.AUTO: NEGATIVE
LYMPHOCYTES # BLD AUTO: 2.28 K/UL (ref 1–4.8)
LYMPHOCYTES NFR BLD: 30.2 % (ref 22–41)
MAGNESIUM SERPL-MCNC: 2.1 MG/DL (ref 1.5–2.5)
MCH RBC QN AUTO: 31.9 PG (ref 27–33)
MCHC RBC AUTO-ENTMCNC: 35.1 G/DL (ref 32.3–36.5)
MCV RBC AUTO: 90.9 FL (ref 81.4–97.8)
MICRO URNS: NORMAL
MONOCYTES # BLD AUTO: 0.61 K/UL (ref 0–0.85)
MONOCYTES NFR BLD AUTO: 8.1 % (ref 0–13.4)
NEUTROPHILS # BLD AUTO: 4.26 K/UL (ref 1.82–7.42)
NEUTROPHILS NFR BLD: 56.2 % (ref 44–72)
NITRITE UR QL STRIP.AUTO: NEGATIVE
NRBC # BLD AUTO: 0 K/UL
NRBC BLD-RTO: 0 /100 WBC (ref 0–0.2)
PH UR STRIP.AUTO: 6 [PH] (ref 5–8)
PHOSPHATE SERPL-MCNC: 3.2 MG/DL (ref 2.5–4.5)
PLATELET # BLD AUTO: 152 K/UL (ref 164–446)
PMV BLD AUTO: 12.5 FL (ref 9–12.9)
POTASSIUM SERPL-SCNC: 4.3 MMOL/L (ref 3.6–5.5)
PROT SERPL-MCNC: 6.8 G/DL (ref 6–8.2)
PROT UR QL STRIP: NEGATIVE MG/DL
RBC # BLD AUTO: 5.08 M/UL (ref 4.7–6.1)
RBC UR QL AUTO: NEGATIVE
SCCMEC + MECA PNL NOSE NAA+PROBE: NEGATIVE
SIGNIFICANT IND 70042: NORMAL
SITE SITE: NORMAL
SODIUM SERPL-SCNC: 142 MMOL/L (ref 135–145)
SOURCE SOURCE: NORMAL
SP GR UR STRIP.AUTO: 1.01
UROBILINOGEN UR STRIP.AUTO-MCNC: 0.2 MG/DL
WBC # BLD AUTO: 7.6 K/UL (ref 4.8–10.8)

## 2024-06-15 PROCEDURE — 80053 COMPREHEN METABOLIC PANEL: CPT

## 2024-06-15 PROCEDURE — 700111 HCHG RX REV CODE 636 W/ 250 OVERRIDE (IP): Performed by: ANESTHESIOLOGY

## 2024-06-15 PROCEDURE — 84100 ASSAY OF PHOSPHORUS: CPT

## 2024-06-15 PROCEDURE — 3074F SYST BP LT 130 MM HG: CPT | Performed by: FAMILY MEDICINE

## 2024-06-15 PROCEDURE — 770001 HCHG ROOM/CARE - MED/SURG/GYN PRIV*

## 2024-06-15 PROCEDURE — 87075 CULTR BACTERIA EXCEPT BLOOD: CPT

## 2024-06-15 PROCEDURE — 36415 COLL VENOUS BLD VENIPUNCTURE: CPT

## 2024-06-15 PROCEDURE — C1729 CATH, DRAINAGE: HCPCS | Performed by: UROLOGY

## 2024-06-15 PROCEDURE — 160035 HCHG PACU - 1ST 60 MINS PHASE I: Performed by: UROLOGY

## 2024-06-15 PROCEDURE — 700111 HCHG RX REV CODE 636 W/ 250 OVERRIDE (IP): Mod: JZ | Performed by: EMERGENCY MEDICINE

## 2024-06-15 PROCEDURE — 96375 TX/PRO/DX INJ NEW DRUG ADDON: CPT

## 2024-06-15 PROCEDURE — 87086 URINE CULTURE/COLONY COUNT: CPT

## 2024-06-15 PROCEDURE — 83605 ASSAY OF LACTIC ACID: CPT

## 2024-06-15 PROCEDURE — A9270 NON-COVERED ITEM OR SERVICE: HCPCS | Performed by: ANESTHESIOLOGY

## 2024-06-15 PROCEDURE — 700102 HCHG RX REV CODE 250 W/ 637 OVERRIDE(OP): Performed by: ANESTHESIOLOGY

## 2024-06-15 PROCEDURE — 81003 URINALYSIS AUTO W/O SCOPE: CPT

## 2024-06-15 PROCEDURE — 700101 HCHG RX REV CODE 250: Performed by: ANESTHESIOLOGY

## 2024-06-15 PROCEDURE — 700102 HCHG RX REV CODE 250 W/ 637 OVERRIDE(OP): Performed by: STUDENT IN AN ORGANIZED HEALTH CARE EDUCATION/TRAINING PROGRAM

## 2024-06-15 PROCEDURE — 96365 THER/PROPH/DIAG IV INF INIT: CPT

## 2024-06-15 PROCEDURE — 83735 ASSAY OF MAGNESIUM: CPT

## 2024-06-15 PROCEDURE — A9270 NON-COVERED ITEM OR SERVICE: HCPCS | Performed by: STUDENT IN AN ORGANIZED HEALTH CARE EDUCATION/TRAINING PROGRAM

## 2024-06-15 PROCEDURE — 87641 MR-STAPH DNA AMP PROBE: CPT

## 2024-06-15 PROCEDURE — 160027 HCHG SURGERY MINUTES - 1ST 30 MINS LEVEL 2: Performed by: UROLOGY

## 2024-06-15 PROCEDURE — 87070 CULTURE OTHR SPECIMN AEROBIC: CPT

## 2024-06-15 PROCEDURE — 700105 HCHG RX REV CODE 258: Performed by: STUDENT IN AN ORGANIZED HEALTH CARE EDUCATION/TRAINING PROGRAM

## 2024-06-15 PROCEDURE — 160048 HCHG OR STATISTICAL LEVEL 1-5: Performed by: UROLOGY

## 2024-06-15 PROCEDURE — 160002 HCHG RECOVERY MINUTES (STAT): Performed by: UROLOGY

## 2024-06-15 PROCEDURE — 87076 CULTURE ANAEROBE IDENT EACH: CPT

## 2024-06-15 PROCEDURE — 85025 COMPLETE CBC W/AUTO DIFF WBC: CPT

## 2024-06-15 PROCEDURE — 99214 OFFICE O/P EST MOD 30 MIN: CPT | Performed by: FAMILY MEDICINE

## 2024-06-15 PROCEDURE — 160009 HCHG ANES TIME/MIN: Performed by: UROLOGY

## 2024-06-15 PROCEDURE — 99291 CRITICAL CARE FIRST HOUR: CPT

## 2024-06-15 PROCEDURE — 87040 BLOOD CULTURE FOR BACTERIA: CPT

## 2024-06-15 PROCEDURE — 87205 SMEAR GRAM STAIN: CPT

## 2024-06-15 PROCEDURE — 700111 HCHG RX REV CODE 636 W/ 250 OVERRIDE (IP): Mod: JZ | Performed by: STUDENT IN AN ORGANIZED HEALTH CARE EDUCATION/TRAINING PROGRAM

## 2024-06-15 PROCEDURE — 99223 1ST HOSP IP/OBS HIGH 75: CPT | Performed by: STUDENT IN AN ORGANIZED HEALTH CARE EDUCATION/TRAINING PROGRAM

## 2024-06-15 PROCEDURE — 76870 US EXAM SCROTUM: CPT

## 2024-06-15 PROCEDURE — 700105 HCHG RX REV CODE 258: Performed by: ANESTHESIOLOGY

## 2024-06-15 PROCEDURE — 700105 HCHG RX REV CODE 258: Performed by: EMERGENCY MEDICINE

## 2024-06-15 PROCEDURE — 3079F DIAST BP 80-89 MM HG: CPT | Performed by: FAMILY MEDICINE

## 2024-06-15 PROCEDURE — 160038 HCHG SURGERY MINUTES - EA ADDL 1 MIN LEVEL 2: Performed by: UROLOGY

## 2024-06-15 RX ORDER — OXYCODONE HCL 5 MG/5 ML
10 SOLUTION, ORAL ORAL
Status: COMPLETED | OUTPATIENT
Start: 2024-06-15 | End: 2024-06-15

## 2024-06-15 RX ORDER — HYDROMORPHONE HYDROCHLORIDE 1 MG/ML
1 INJECTION, SOLUTION INTRAMUSCULAR; INTRAVENOUS; SUBCUTANEOUS
Status: DISCONTINUED | OUTPATIENT
Start: 2024-06-15 | End: 2024-06-16 | Stop reason: HOSPADM

## 2024-06-15 RX ORDER — PROMETHAZINE HYDROCHLORIDE 25 MG/1
12.5-25 SUPPOSITORY RECTAL EVERY 4 HOURS PRN
Status: DISCONTINUED | OUTPATIENT
Start: 2024-06-15 | End: 2024-06-16 | Stop reason: HOSPADM

## 2024-06-15 RX ORDER — AMOXICILLIN 250 MG
2 CAPSULE ORAL EVERY EVENING
Status: DISCONTINUED | OUTPATIENT
Start: 2024-06-15 | End: 2024-06-16 | Stop reason: HOSPADM

## 2024-06-15 RX ORDER — HALOPERIDOL 5 MG/ML
1 INJECTION INTRAMUSCULAR
Status: DISCONTINUED | OUTPATIENT
Start: 2024-06-15 | End: 2024-06-15 | Stop reason: HOSPADM

## 2024-06-15 RX ORDER — ACETAMINOPHEN 325 MG/1
650 TABLET ORAL EVERY 6 HOURS PRN
Status: DISCONTINUED | OUTPATIENT
Start: 2024-06-15 | End: 2024-06-16 | Stop reason: HOSPADM

## 2024-06-15 RX ORDER — DIPHENHYDRAMINE HYDROCHLORIDE 50 MG/ML
12.5 INJECTION INTRAMUSCULAR; INTRAVENOUS
Status: DISCONTINUED | OUTPATIENT
Start: 2024-06-15 | End: 2024-06-15 | Stop reason: HOSPADM

## 2024-06-15 RX ORDER — DIPHENHYDRAMINE HYDROCHLORIDE 50 MG/ML
25 INJECTION INTRAMUSCULAR; INTRAVENOUS EVERY 6 HOURS PRN
Status: DISCONTINUED | OUTPATIENT
Start: 2024-06-15 | End: 2024-06-16 | Stop reason: HOSPADM

## 2024-06-15 RX ORDER — KETOROLAC TROMETHAMINE 15 MG/ML
INJECTION, SOLUTION INTRAMUSCULAR; INTRAVENOUS PRN
Status: DISCONTINUED | OUTPATIENT
Start: 2024-06-15 | End: 2024-06-15 | Stop reason: SURG

## 2024-06-15 RX ORDER — PROMETHAZINE HYDROCHLORIDE 25 MG/1
12.5-25 TABLET ORAL EVERY 4 HOURS PRN
Status: DISCONTINUED | OUTPATIENT
Start: 2024-06-15 | End: 2024-06-16 | Stop reason: HOSPADM

## 2024-06-15 RX ORDER — OXYCODONE HCL 5 MG/5 ML
5 SOLUTION, ORAL ORAL
Status: COMPLETED | OUTPATIENT
Start: 2024-06-15 | End: 2024-06-15

## 2024-06-15 RX ORDER — ONDANSETRON 2 MG/ML
4 INJECTION INTRAMUSCULAR; INTRAVENOUS EVERY 4 HOURS PRN
Status: DISCONTINUED | OUTPATIENT
Start: 2024-06-15 | End: 2024-06-16 | Stop reason: HOSPADM

## 2024-06-15 RX ORDER — MIDAZOLAM HYDROCHLORIDE 1 MG/ML
INJECTION INTRAMUSCULAR; INTRAVENOUS PRN
Status: DISCONTINUED | OUTPATIENT
Start: 2024-06-15 | End: 2024-06-15 | Stop reason: SURG

## 2024-06-15 RX ORDER — PROCHLORPERAZINE EDISYLATE 5 MG/ML
5-10 INJECTION INTRAMUSCULAR; INTRAVENOUS EVERY 4 HOURS PRN
Status: DISCONTINUED | OUTPATIENT
Start: 2024-06-15 | End: 2024-06-16 | Stop reason: HOSPADM

## 2024-06-15 RX ORDER — SODIUM CHLORIDE, SODIUM LACTATE, POTASSIUM CHLORIDE, CALCIUM CHLORIDE 600; 310; 30; 20 MG/100ML; MG/100ML; MG/100ML; MG/100ML
INJECTION, SOLUTION INTRAVENOUS
Status: DISCONTINUED | OUTPATIENT
Start: 2024-06-15 | End: 2024-06-15 | Stop reason: SURG

## 2024-06-15 RX ORDER — DEXMEDETOMIDINE HYDROCHLORIDE 100 UG/ML
INJECTION, SOLUTION INTRAVENOUS PRN
Status: DISCONTINUED | OUTPATIENT
Start: 2024-06-15 | End: 2024-06-15 | Stop reason: SURG

## 2024-06-15 RX ORDER — POLYETHYLENE GLYCOL 3350 17 G/17G
1 POWDER, FOR SOLUTION ORAL
Status: DISCONTINUED | OUTPATIENT
Start: 2024-06-15 | End: 2024-06-16 | Stop reason: HOSPADM

## 2024-06-15 RX ORDER — LABETALOL HYDROCHLORIDE 5 MG/ML
10 INJECTION, SOLUTION INTRAVENOUS EVERY 4 HOURS PRN
Status: DISCONTINUED | OUTPATIENT
Start: 2024-06-15 | End: 2024-06-16 | Stop reason: HOSPADM

## 2024-06-15 RX ORDER — HYDROCODONE BITARTRATE AND ACETAMINOPHEN 5; 325 MG/1; MG/1
1 TABLET ORAL EVERY 6 HOURS PRN
Status: DISCONTINUED | OUTPATIENT
Start: 2024-06-15 | End: 2024-06-16 | Stop reason: HOSPADM

## 2024-06-15 RX ORDER — ONDANSETRON 2 MG/ML
4 INJECTION INTRAMUSCULAR; INTRAVENOUS ONCE
Status: COMPLETED | OUTPATIENT
Start: 2024-06-15 | End: 2024-06-15

## 2024-06-15 RX ORDER — ONDANSETRON 2 MG/ML
4 INJECTION INTRAMUSCULAR; INTRAVENOUS
Status: DISCONTINUED | OUTPATIENT
Start: 2024-06-15 | End: 2024-06-15 | Stop reason: HOSPADM

## 2024-06-15 RX ORDER — ONDANSETRON 4 MG/1
4 TABLET, ORALLY DISINTEGRATING ORAL EVERY 4 HOURS PRN
Status: DISCONTINUED | OUTPATIENT
Start: 2024-06-15 | End: 2024-06-16 | Stop reason: HOSPADM

## 2024-06-15 RX ORDER — MORPHINE SULFATE 4 MG/ML
4 INJECTION INTRAVENOUS ONCE
Status: COMPLETED | OUTPATIENT
Start: 2024-06-15 | End: 2024-06-15

## 2024-06-15 RX ADMIN — ONDANSETRON 4 MG: 2 INJECTION INTRAMUSCULAR; INTRAVENOUS at 11:59

## 2024-06-15 RX ADMIN — VANCOMYCIN HYDROCHLORIDE 2500 MG: 5 INJECTION, POWDER, LYOPHILIZED, FOR SOLUTION INTRAVENOUS at 14:16

## 2024-06-15 RX ADMIN — KETOROLAC TROMETHAMINE 30 MG: 15 INJECTION, SOLUTION INTRAMUSCULAR; INTRAVENOUS at 15:12

## 2024-06-15 RX ADMIN — SENNOSIDES AND DOCUSATE SODIUM 2 TABLET: 50; 8.6 TABLET ORAL at 18:04

## 2024-06-15 RX ADMIN — DEXMEDETOMIDINE HYDROCHLORIDE 20 MCG: 100 INJECTION, SOLUTION INTRAVENOUS at 15:04

## 2024-06-15 RX ADMIN — MIDAZOLAM HYDROCHLORIDE 2 MG: 2 INJECTION, SOLUTION INTRAMUSCULAR; INTRAVENOUS at 15:04

## 2024-06-15 RX ADMIN — PROPOFOL 100 MG: 10 INJECTION, EMULSION INTRAVENOUS at 15:19

## 2024-06-15 RX ADMIN — AMPICILLIN AND SULBACTAM 3 G: 1; 2 INJECTION, POWDER, FOR SOLUTION INTRAMUSCULAR; INTRAVENOUS at 12:56

## 2024-06-15 RX ADMIN — DEXMEDETOMIDINE HYDROCHLORIDE 20 MCG: 100 INJECTION, SOLUTION INTRAVENOUS at 15:15

## 2024-06-15 RX ADMIN — PIPERACILLIN AND TAZOBACTAM 3.38 G: 3; .375 INJECTION, POWDER, FOR SOLUTION INTRAVENOUS at 20:36

## 2024-06-15 RX ADMIN — PROPOFOL 200 MG: 10 INJECTION, EMULSION INTRAVENOUS at 15:04

## 2024-06-15 RX ADMIN — SODIUM CHLORIDE, POTASSIUM CHLORIDE, SODIUM LACTATE AND CALCIUM CHLORIDE: 600; 310; 30; 20 INJECTION, SOLUTION INTRAVENOUS at 15:01

## 2024-06-15 RX ADMIN — PIPERACILLIN AND TAZOBACTAM 3.38 G: 3; .375 INJECTION, POWDER, FOR SOLUTION INTRAVENOUS at 23:54

## 2024-06-15 RX ADMIN — AMPICILLIN AND SULBACTAM 3 G: 1; 2 INJECTION, POWDER, FOR SOLUTION INTRAMUSCULAR; INTRAVENOUS at 18:03

## 2024-06-15 RX ADMIN — OXYCODONE HYDROCHLORIDE 10 MG: 5 SOLUTION ORAL at 16:00

## 2024-06-15 RX ADMIN — FENTANYL CITRATE 100 MCG: 50 INJECTION, SOLUTION INTRAMUSCULAR; INTRAVENOUS at 15:04

## 2024-06-15 RX ADMIN — MORPHINE SULFATE 4 MG: 4 INJECTION INTRAVENOUS at 11:58

## 2024-06-15 RX ADMIN — DIPHENHYDRAMINE HYDROCHLORIDE 25 MG: 50 INJECTION, SOLUTION INTRAMUSCULAR; INTRAVENOUS at 19:09

## 2024-06-15 SDOH — ECONOMIC STABILITY: TRANSPORTATION INSECURITY
IN THE PAST 12 MONTHS, HAS LACK OF RELIABLE TRANSPORTATION KEPT YOU FROM MEDICAL APPOINTMENTS, MEETINGS, WORK OR FROM GETTING THINGS NEEDED FOR DAILY LIVING?: NO

## 2024-06-15 SDOH — ECONOMIC STABILITY: TRANSPORTATION INSECURITY
IN THE PAST 12 MONTHS, HAS THE LACK OF TRANSPORTATION KEPT YOU FROM MEDICAL APPOINTMENTS OR FROM GETTING MEDICATIONS?: NO

## 2024-06-15 ASSESSMENT — LIFESTYLE VARIABLES
DOES PATIENT WANT TO STOP DRINKING: NO
ALCOHOL_USE: YES
EVER HAD A DRINK FIRST THING IN THE MORNING TO STEADY YOUR NERVES TO GET RID OF A HANGOVER: NO
HAVE YOU EVER FELT YOU SHOULD CUT DOWN ON YOUR DRINKING: NO
AVERAGE NUMBER OF DAYS PER WEEK YOU HAVE A DRINK CONTAINING ALCOHOL: 7
HOW MANY TIMES IN THE PAST YEAR HAVE YOU HAD 5 OR MORE DRINKS IN A DAY: 10
ON A TYPICAL DAY WHEN YOU DRINK ALCOHOL HOW MANY DRINKS DO YOU HAVE: 6
SUBSTANCE_ABUSE: 0
TOTAL SCORE: 0
CONSUMPTION TOTAL: POSITIVE
EVER FELT BAD OR GUILTY ABOUT YOUR DRINKING: NO
HAVE PEOPLE ANNOYED YOU BY CRITICIZING YOUR DRINKING: NO
TOTAL SCORE: 0
TOTAL SCORE: 0

## 2024-06-15 ASSESSMENT — ENCOUNTER SYMPTOMS
FALLS: 0
SHORTNESS OF BREATH: 0
FOCAL WEAKNESS: 0
SORE THROAT: 0
STRIDOR: 0
FEVER: 0
PALPITATIONS: 0
FEVER: 0
NERVOUS/ANXIOUS: 0
SPEECH CHANGE: 0
NAUSEA: 0
SHORTNESS OF BREATH: 0
DOUBLE VISION: 0
DIZZINESS: 0
MYALGIAS: 0
BLURRED VISION: 0
NAUSEA: 0
CHILLS: 0
SENSORY CHANGE: 0
VOMITING: 0
CHILLS: 0
COUGH: 0
COUGH: 0
VOMITING: 0

## 2024-06-15 ASSESSMENT — PAIN DESCRIPTION - PAIN TYPE
TYPE: SURGICAL PAIN
TYPE: ACUTE PAIN
TYPE: SURGICAL PAIN
TYPE: ACUTE PAIN
TYPE: SURGICAL PAIN
TYPE: SURGICAL PAIN

## 2024-06-15 ASSESSMENT — COGNITIVE AND FUNCTIONAL STATUS - GENERAL
WALKING IN HOSPITAL ROOM: A LITTLE
MOBILITY SCORE: 22
DAILY ACTIVITIY SCORE: 24
SUGGESTED CMS G CODE MODIFIER DAILY ACTIVITY: CH
CLIMB 3 TO 5 STEPS WITH RAILING: A LITTLE
SUGGESTED CMS G CODE MODIFIER MOBILITY: CJ

## 2024-06-15 ASSESSMENT — FIBROSIS 4 INDEX
FIB4 SCORE: 1.27
FIB4 SCORE: 0.96
FIB4 SCORE: 0.96
FIB4 SCORE: 1.27

## 2024-06-15 ASSESSMENT — SOCIAL DETERMINANTS OF HEALTH (SDOH)
WITHIN THE LAST YEAR, HAVE YOU BEEN KICKED, HIT, SLAPPED, OR OTHERWISE PHYSICALLY HURT BY YOUR PARTNER OR EX-PARTNER?: NO
IN THE PAST 12 MONTHS, HAS THE ELECTRIC, GAS, OIL, OR WATER COMPANY THREATENED TO SHUT OFF SERVICE IN YOUR HOME?: NO
WITHIN THE PAST 12 MONTHS, THE FOOD YOU BOUGHT JUST DIDN'T LAST AND YOU DIDN'T HAVE MONEY TO GET MORE: NEVER TRUE
WITHIN THE LAST YEAR, HAVE TO BEEN RAPED OR FORCED TO HAVE ANY KIND OF SEXUAL ACTIVITY BY YOUR PARTNER OR EX-PARTNER?: NO
WITHIN THE LAST YEAR, HAVE YOU BEEN AFRAID OF YOUR PARTNER OR EX-PARTNER?: NO
WITHIN THE LAST YEAR, HAVE YOU BEEN HUMILIATED OR EMOTIONALLY ABUSED IN OTHER WAYS BY YOUR PARTNER OR EX-PARTNER?: NO
WITHIN THE PAST 12 MONTHS, YOU WORRIED THAT YOUR FOOD WOULD RUN OUT BEFORE YOU GOT THE MONEY TO BUY MORE: NEVER TRUE

## 2024-06-15 ASSESSMENT — PAIN SCALES - GENERAL: PAIN_LEVEL: 2

## 2024-06-15 ASSESSMENT — PATIENT HEALTH QUESTIONNAIRE - PHQ9
SUM OF ALL RESPONSES TO PHQ9 QUESTIONS 1 AND 2: 0
2. FEELING DOWN, DEPRESSED, IRRITABLE, OR HOPELESS: NOT AT ALL
1. LITTLE INTEREST OR PLEASURE IN DOING THINGS: NOT AT ALL

## 2024-06-15 NOTE — ANESTHESIA TIME REPORT
Anesthesia Start and Stop Event Times       Date Time Event    6/15/2024 1447 Ready for Procedure     1501 Anesthesia Start     1537 Anesthesia Stop          Responsible Staff  06/15/24      Name Role Begin End    Stoney Garcia M.D. Anesth 1501 1537          Overtime Reason:  no overtime (within assigned shift)    Comments:

## 2024-06-15 NOTE — ED TRIAGE NOTES
.  Chief Complaint   Patient presents with    Abscess     Left groin, since Monday.       Pt ambulate to triage with above complaint. Pt sent over from Urgent Care for further treatment.    Pt educated on triage process and returned to [facility].

## 2024-06-15 NOTE — ANESTHESIA PREPROCEDURE EVALUATION
Case: [id number] Date/Time: 06/15/24 1351    Procedure: INCISION AND DRAINAGE, SCROTUM (Left)    Location: [facility] OR 16 / SURGERY [facility]    Surgeons: Justice Lee M.D.            Relevant Problems   No relevant active problems       Physical Exam    Airway   Mallampati: II  TM distance: >3 FB  Neck ROM: full       Cardiovascular - normal exam  Rhythm: regular  Rate: normal  (-) murmur     Dental - normal exam           Pulmonary - normal exam  Breath sounds clear to auscultation     Abdominal    Neurological - normal exam                   Anesthesia Plan    ASA 1       Plan - general       Airway plan will be LMA          Induction: intravenous    Postoperative Plan: Postoperative administration of opioids is intended.    Pertinent diagnostic labs and testing reviewed    Informed Consent:    Anesthetic plan and risks discussed with patient.    Use of blood products discussed with: patient whom consented to blood products.

## 2024-06-15 NOTE — PROGRESS NOTES
Subjective:   Roman Magana is a 34 y.o. male who presents for Groin Pain (Swollen node, painful Since Tuesday in L side groin)        Groin Pain  This is a new (Reports pain in the left side of groin over the past 5 days, reports worsening pain with increased redness and pain over the past few days) problem. The current episode started in the past 7 days. The problem occurs constantly. The problem has been gradually worsening. Pertinent negatives include no chills, coughing, fever, myalgias, nausea, rash, sore throat or vomiting. Associated symptoms comments: Denies penile or scrotal rash, denies ulceration, denies dysuria, denies penile exudative drainage    Denies unprotected intercourse in the past 90 days denies known exposure to sexually transmitted disease. Exacerbated by: Worsening pain with direct pressure. He has tried rest for the symptoms. The treatment provided no relief.     PMH:  has a past medical history of Migraine.  MEDS:   Current Outpatient Medications:     benzonatate (TESSALON) 200 MG capsule, Take 1 Capsule by mouth 3 times a day as needed for Cough. (Patient not taking: Reported on 6/15/2024), Disp: 30 Capsule, Rfl: 0    ibuprofen (MOTRIN) 200 MG Tab, Take 200 mg by mouth every 6 hours as needed. (Patient not taking: Reported on 6/15/2024), Disp: , Rfl:     acetaminophen (TYLENOL) 500 MG Tab, Take 500-1,000 mg by mouth every 6 hours as needed. (Patient not taking: Reported on 6/15/2024), Disp: , Rfl:   ALLERGIES: No Known Allergies  SURGHX:   Past Surgical History:   Procedure Laterality Date    LUMBAR LAMINECTOMY DISKECTOMY  2018    Benign mass excision     APPENDECTOMY       SOCHX:  reports that he quit smoking about 9 years ago. His smoking use included cigarettes. He has never used smokeless tobacco. He reports current alcohol use of about 3.6 oz of alcohol per week. He reports that he does not use drugs.  FH:   Family History   Problem Relation Age of Onset    Cancer Mother          breast    No Known Problems Father     Diabetes Maternal Grandfather     Heart Disease Maternal Grandfather      Review of Systems   Constitutional:  Negative for chills and fever.   HENT:  Negative for sore throat.    Respiratory:  Negative for cough and shortness of breath.    Gastrointestinal:  Negative for nausea and vomiting.   Genitourinary:  Negative for dysuria and hematuria.   Musculoskeletal:  Negative for myalgias.   Skin:  Negative for rash.   Neurological:  Negative for dizziness.        Objective:   /84   Pulse 72   Temp 36.9 °C (98.4 °F) (Temporal)   Resp 16   Ht 1.829 m (6')   Wt 99.8 kg (220 lb)   SpO2 98%   BMI 29.84 kg/m²   Physical Exam  Vitals and nursing note reviewed.   Constitutional:       General: He is not in acute distress.     Appearance: He is well-developed.   HENT:      Head: Normocephalic and atraumatic.      Right Ear: External ear normal.      Left Ear: External ear normal.      Nose: Nose normal.      Mouth/Throat:      Mouth: Mucous membranes are moist.   Eyes:      Conjunctiva/sclera: Conjunctivae normal.   Cardiovascular:      Rate and Rhythm: Normal rate.   Pulmonary:      Effort: Pulmonary effort is normal. No respiratory distress.      Breath sounds: Normal breath sounds.   Abdominal:      General: There is no distension.   Genitourinary:      Musculoskeletal:         General: Normal range of motion.   Skin:     General: Skin is warm and dry.   Neurological:      General: No focal deficit present.      Mental Status: He is alert and oriented to person, place, and time. Mental status is at baseline.      Gait: Gait (gait at baseline) normal.   Psychiatric:         Judgment: Judgment normal.           Assessment/Plan:   1. Abscess of groin, left        Medical Decision Making/Course:  In the context of the clinical presentation of large sized groin abscess with marked tenderness there is a clinical concern for necessity of ultrasound imaging and direct  ultrasound visualization for consideration of incision and drainage and/or further imaging for consideration of deeper abscess and/or Lindsay's gangrene accordingly emergency department evaluation management is warranted.  The [facility] emergency department was advised and the patient will transport by private vehicle and the transfer center notified.    In the course of preparing for this visit with review of the pertinent past medical history, recent and past clinic visits, current medications, and performing chart, immunization, medical history and medication reconciliation, and in the further course of obtaining the current history pertinent to the clinic visit today, performing an exam and evaluation, ordering and independently evaluating labs, tests  , and/or procedures, prescribing any recommended new medications as noted above, providing any pertinent counseling and education and recommending further coordination of care including contacting coordination with transfer center, at least  36 minutes of total time were spent during this encounter.        Please note that this dictation was created using voice recognition software. I have worked with consultants from the vendor as well as technical experts from [facility] to optimize the interface. I have made every reasonable attempt to correct obvious errors, but I expect that there are errors of grammar and possibly content that I did not discover before finalizing the note.

## 2024-06-15 NOTE — ANESTHESIA POSTPROCEDURE EVALUATION
Patient: Roman Magana    Procedure Summary       Date: 06/15/24 Room / Location: [place] [address] / SURGERY [facility]    Anesthesia Start: 1501 Anesthesia Stop: 1537    Procedure: INCISION AND DRAINAGE, SCROTUM (Left) Diagnosis:     Surgeons: Jsutice Lee M.D. Responsible Provider: Stoney Garcia M.D.    Anesthesia Type: general ASA Status: 1            Final Anesthesia Type: general  Last vitals  BP   Blood Pressure: 90/53    Temp   36.3 °C (97.4 °F)    Pulse   77   Resp   13    SpO2   93 %      Anesthesia Post Evaluation    Patient location during evaluation: PACU  Patient participation: complete - patient participated  Level of consciousness: awake and alert  Pain score: 2    Airway patency: patent  Anesthetic complications: no  Cardiovascular status: hemodynamically stable  Respiratory status: acceptable  Hydration status: euvolemic    PONV: none          No notable events documented.     Nurse Pain Score: 7 (NPRS)

## 2024-06-15 NOTE — ED PROVIDER NOTES
ED Provider Note    CHIEF COMPLAINT  Chief Complaint   Patient presents with    Abscess     Left groin, since Monday.        EXTERNAL RECORDS REVIEWED  Outpatient Notes Review from  6/15    HPI/ROS  LIMITATION TO HISTORY   Select: : None      Roman Magana is a 34 y.o. male who presents w/ CC of groin infeciton  Sx began Monday or Tuesday  Pt states that he thought he had bug bite and irritation.   He states that it has gotten worse since that time.   Denies fever.   Denies testicular pain or penis pain.     No prior episodes of similar.   Pt describes pain as currently 2 out of 10.     PAST MEDICAL HISTORY   has a past medical history of Migraine.    SURGICAL HISTORY   has a past surgical history that includes appendectomy and lumbar laminectomy diskectomy (2018).    FAMILY HISTORY  Family History   Problem Relation Age of Onset    Cancer Mother         breast    No Known Problems Father     Diabetes Maternal Grandfather     Heart Disease Maternal Grandfather        SOCIAL HISTORY  Social History     Tobacco Use    Smoking status: Former     Current packs/day: 0.00     Types: Cigarettes     Quit date:      Years since quittin.4    Smokeless tobacco: Never   Vaping Use    Vaping status: Never Used   Substance and Sexual Activity    Alcohol use: Yes     Alcohol/week: 3.6 oz     Types: 6 Cans of beer per week     Comment: week    Drug use: No    Sexual activity: Yes     Partners: Female       CURRENT MEDICATIONS  Home Medications       Reviewed by Jesenia Levy R.N. (Registered Nurse) on 06/15/24 at 1429  Med List Status: Partial     Medication Last Dose Status   acetaminophen (TYLENOL) 500 MG Tab  Active   benzonatate (TESSALON) 200 MG capsule  Active   ibuprofen (MOTRIN) 200 MG Tab  Active                  Audit from Redirected Encounters    **Home medications have not yet been reviewed for this encounter**         ALLERGIES  No Known Allergies    PHYSICAL EXAM  VITAL SIGNS: /65    Pulse 69   Temp 36.2 °C (97.2 °F) (Temporal)   Resp 15   Ht 1.829 m (6')   Wt 102 kg (225 lb 12 oz)   SpO2 94%   BMI 30.62 kg/m²    3cm area of left scrotal fluctuance   No active draining or discharge  Markedly tender to palpation  Superficial erythema is present  No crepitus  No significant testicular tenderness or penile tenderness  No thigh crepitus or tenderness    Labs Reviewed   CBC WITH DIFFERENTIAL - Abnormal; Notable for the following components:       Result Value    Platelet Count 152 (*)     All other components within normal limits   COMP METABOLIC PANEL - Abnormal; Notable for the following components:    ALT(SGPT) 79 (*)     All other components within normal limits   LACTIC ACID   URINALYSIS   ESTIMATED GFR   MAGNESIUM   PHOSPHORUS   LACTIC ACID   LACTIC ACID   URINE CULTURE(NEW)   BLOOD CULTURE   BLOOD CULTURE   MRSA BY PCR (AMP)        RADIOLOGY/PROCEDURES   I have independently interpreted the diagnostic imaging associated with this visit and am waiting the final reading from the radiologist.   My preliminary interpretation is as follows: Intact flow    Radiologist interpretation:  [id number]   Final Result      1.  Borderline mild left varicocele with individual pampiniform plexus vein diameter up to 2.6 mm.          COURSE & MEDICAL DECISION MAKING    ASSESSMENT, COURSE AND PLAN  Care Narrative:     Mild elevation in ALT otherwise unremarkable blood test  Given scrotal abscess ultrasound ordered to ensure no insidious mass or other etiology of abscess, none present  Vancomycin and Unasyn given  I discussed this case with on-call surgeon Dr. Lee who agrees for patient to be taken to OR today for drainage of abscess    I discussed this case with admitting physician Dr. Jules he agrees with admit at request of surgery      Medications   vancomycin (Vancocin) 2,500 mg in  mL IVPB ( Intravenous Rate Verify 6/15/24 0485)   acetaminophen (Tylenol) tablet 650 mg (has no  administration in time range)   senna-docusate (Pericolace Or Senokot S) 8.6-50 MG per tablet 2 Tablet (has no administration in time range)     And   polyethylene glycol/lytes (Miralax) Packet 1 Packet (has no administration in time range)   labetalol (Normodyne/Trandate) injection 10 mg (has no administration in time range)   ondansetron (Zofran) syringe/vial injection 4 mg (has no administration in time range)   ondansetron (Zofran ODT) dispertab 4 mg (has no administration in time range)   promethazine (Phenergan) tablet 12.5-25 mg (has no administration in time range)   promethazine (Phenergan) suppository 12.5-25 mg (has no administration in time range)   prochlorperazine (Compazine) injection 5-10 mg (has no administration in time range)   HYDROcodone-acetaminophen (Norco) 5-325 MG per tablet 1 Tablet (has no administration in time range)   HYDROmorphone (Dilaudid) injection 1 mg (has no administration in time range)   MD Alert...Vancomycin per Pharmacy (has no administration in time range)   ampicillin/sulbactam (Unasyn) 3 g in  mL IVPB (has no administration in time range)   ondansetron (Zofran) syringe/vial injection 4 mg (has no administration in time range)   haloperidol lactate (Haldol) injection 1 mg (has no administration in time range)   diphenhydrAMINE (Benadryl) injection 12.5 mg (has no administration in time range)   fentaNYL (Sublimaze) injection 25 mcg (has no administration in time range)     Or   fentaNYL (Sublimaze) injection 50 mcg (has no administration in time range)   ampicillin/sulbactam (Unasyn) 3 g in  mL IVPB (0 g Intravenous Stopped 6/15/24 1326)   morphine 4 MG/ML injection 4 mg (4 mg Intravenous Given 6/15/24 1158)   ondansetron (Zofran) syringe/vial injection 4 mg (4 mg Intravenous Given 6/15/24 1159)   oxyCODONE (Roxicodone) oral solution 5 mg ( Oral See Alternative 6/15/24 1600)     Or   oxyCODONE (Roxicodone) oral solution 10 mg (10 mg Oral Given 6/15/24 1600)       DISPOSITION AND DISCUSSIONS  I have discussed management of the patient with the following physicians and ALICIA's:  Rosa/Jogre A    FINAL DIAGNOSIS  1. Scrotal abscess           Electronically signed by: Mesfin Billingsley M.D., 6/15/2024 11:18 AM

## 2024-06-15 NOTE — PROGRESS NOTES
Urology Pre-op Note:  35 yo M with scrotal abscess - transferred from urgent care.    Plan:  Cont broad spectrum abx, OR for incision and drainage of scrotal abscess.

## 2024-06-15 NOTE — ED NOTES
Patient ambulated to YE 56 with steady gait. Dressed down to gown, placed on pulse ox and blood pressure monitors.  Chart up for ERP.

## 2024-06-15 NOTE — OR SURGEON
"Immediate Post OP Note    PreOp Diagnosis: L scrotal abscess      PostOp Diagnosis: same      Procedure(s):  INCISION AND DRAINAGE, SCROTAL ABSCESS    Surgeon(s):  Justice Lee M.D.    Anesthesiologist/Type of Anesthesia:  Anesthesiologist: Stoney Garcia M.D./* No anesthesia type entered *    Surgical Staff:  Circulator: Evelyne Garrett R.N.  Scrub Person: Jluis Lima    Specimens removed if any:  * No specimens in log *    Estimated Blood Loss: 20ml    Findings: 3cm L scrotal abscess from infected sebaceous cyst    Complications: none    Wound packed open with 1/4\" iodoform gauze    6/15/2024 3:34 PM Justice Lee M.D.  "

## 2024-06-15 NOTE — CONSULTS
DATE OF SERVICE:  06/15/2024     UROLOGY CONSULTATION     REQUESTING PHYSICIAN:  Mesfin Billingsley MD.     CONSULTING PHYSICIAN:  Justice Lee MD.     CONSULTING SERVICE:  Urology.     REASON FOR CONSULTATION:  Left scrotal abscess.     HISTORY OF PRESENT ILLNESS:  The patient is a 34-year-old male with a painful   swelling on the left side of the upper scrotum for the last several days, it   got worse, so he went to the urgent care today and they transferred him to ER   for left scrotal abscess.  The patient has never had infection like this   before.  He denies any injuries to that area or any urinary changes.     PAST MEDICAL HISTORY:  Migraines.     PAST SURGICAL HISTORY:  Appendectomy, lumbar laminectomy and diskectomy.     ALLERGIES:  No known drug allergies.     MEDICATIONS ON ADMISSION:  Acetaminophen, Tessalon, and ibuprofen.     REVIEW OF SYSTEMS:  The patient denies any fevers, chills, chest pain,   shortness of breath, nausea, vomiting, dysuria, gross hematuria, constipation   or diarrhea, chest pain in the left side of the scrotum.     PHYSICAL EXAMINATION:    GENERAL:  The patient is alert, in no acute distress.  LUNGS:  Breathing is nonlabored.  CARDIOVASCULAR:  Pulse is regular.  ABDOMEN:  Soft, nontender, nondistended.  GENITOURINARY:  Shows a circumcised phallus with no lesions.  There is an   approximately 3-4 cm abscess palpable in the left upper scrotum with   surrounding erythema. No active drainage, no signs of necrosis or crepitus.   Bilaterally testes are down, no masses.  EXTREMITIES:  The patient moves all extremities normally.  NEUROLOGIC:  Grossly intact.     LABORATORY DATA:  Show white blood cell count of 7.6, hemoglobin of 16.2,   hematocrit 46%, platelets 152.  Sodium is 142, potassium 4.3, chloride 106,   bicarbonate 24, BUN 10, creatinine 1.03 with a glucose of 96.  Lactic acid is   1.1.     DIAGNOSTIC DATA:  Scrotal ultrasound shows normal testicles with good blood   flow  bilaterally, small left varicocele.     ASSESSMENT AND PLAN:  A 34-year-old male with left scrotal abscess.  Plan is   continue broad spectrum antibiotics and supportive care and go to the   operating room for incision and drainage of left scrotal abscess and packing   of his wound.        ______________________________  MD LOLITA Mcmillan/IFEOMA    DD:  06/15/2024 14:52  DT:  06/15/2024 16:00    Job#:  [id number]

## 2024-06-15 NOTE — OP REPORT
DATE OF SERVICE:  06/15/2024     PREOPERATIVE DIAGNOSIS:  Left scrotal abscess.     POSTOPERATIVE DIAGNOSIS:  Left scrotal abscess.     PROCEDURES PERFORMED:  Incision and drainage of left scrotal abscess.     SURGEON:  Justice Lee MD     ANESTHESIOLOGIST:  Stoney Garcia MD     ANESTHESIA:  General.     INDICATIONS FOR PROCEDURE:  The patient is a 34-year-old male with left   scrotal abscess.  After discussing treatment options, he has elected for   incision and drainage of left scrotal abscess.     DESCRIPTION OF PROCEDURE:  After obtaining informed consent, the patient was   brought to the operating room.  After the induction of general anesthesia, he   was positioned in supine position and his genitalia were prepped and draped in   sterile fashion.  He was already on IV antibiotics so was well covered for   this procedure.  I then made a 3 cm incision overlying the left upper scrotal   abscess down into the abscess cavity when pus began to drain out.  I then took   cultures of the pus and sent them out.  I then widened the incision and then   bluntly dissected with a hemostat until the abscess cavity was all the way   opened.  I irrigated out copiously with antibiotic irrigation.  I then   cauterized the skin edges to obtain hemostasis.  I was able to see there was a   sebaceous cyst wall inside the abscess cavity, so I excised that in its   entirety as well.  I then obtained hemostasis in the wound.  I irrigated again   with antibiotic irrigation until there was no further signs of pus and   cauterized until there was no further bleeding.  I then packed the wound with   1/4 inch iodoform gauze and then covered with 4 x 4 gauze and then athletic   supporter.  The patient was then awoken from anesthesia and taken to recovery   room in stable condition.     COMPLICATIONS:  None.     ESTIMATED BLOOD LOSS:  20 mL     SPECIMENS:  Left scrotal abscess pus.     DRAINS:   None.        ______________________________  MD MERRITT Mcmillan    DD:  06/15/2024 15:38  DT:  06/15/2024 15:48    Job#:  [id number]

## 2024-06-15 NOTE — ANESTHESIA PROCEDURE NOTES
Airway    Date/Time: 6/15/2024 3:06 PM    Performed by: Stoney Garcia M.D.  Authorized by: Stoney Garcia M.D.    Location:  OR  Urgency:  Elective  Indications for Airway Management:  Anesthesia      Spontaneous Ventilation: absent    Sedation Level:  Deep  Preoxygenated: Yes    Final Airway Type:  Supraglottic airway  Final Supraglottic Airway:  Standard LMA    SGA Size:  4  Number of Attempts at Approach:  1

## 2024-06-15 NOTE — OR NURSING
Pt is aaox4, resting comfortably in [facility] on room air. His respirations are equal and unlabored, he is in no acute distress. He is treated for pain per MAR with good response. He does not complain of n/v and drinks sips of water without difficulty. Surgical site packed and is clean, dry and intact. Will continue to monitor.    Pt's s/o updated on status, plan of care and room # with all questions answered.

## 2024-06-16 ENCOUNTER — PHARMACY VISIT (OUTPATIENT)
Dept: PHARMACY | Facility: MEDICAL CENTER | Age: 34
End: 2024-06-16
Payer: COMMERCIAL

## 2024-06-16 VITALS
WEIGHT: 225.75 LBS | SYSTOLIC BLOOD PRESSURE: 124 MMHG | HEART RATE: 85 BPM | DIASTOLIC BLOOD PRESSURE: 86 MMHG | RESPIRATION RATE: 17 BRPM | OXYGEN SATURATION: 96 % | TEMPERATURE: 98.1 F | BODY MASS INDEX: 30.58 KG/M2 | HEIGHT: 72 IN

## 2024-06-16 LAB
ALBUMIN SERPL BCP-MCNC: 3.6 G/DL (ref 3.2–4.9)
ALBUMIN/GLOB SERPL: 1.6 G/DL
ALP SERPL-CCNC: 61 U/L (ref 30–99)
ALT SERPL-CCNC: 56 U/L (ref 2–50)
AMPHET UR QL SCN: NEGATIVE
ANION GAP SERPL CALC-SCNC: 10 MMOL/L (ref 7–16)
AST SERPL-CCNC: 25 U/L (ref 12–45)
BARBITURATES UR QL SCN: NEGATIVE
BASOPHILS # BLD AUTO: 1 % (ref 0–1.8)
BASOPHILS # BLD: 0.08 K/UL (ref 0–0.12)
BENZODIAZ UR QL SCN: POSITIVE
BILIRUB SERPL-MCNC: 0.7 MG/DL (ref 0.1–1.5)
BUN SERPL-MCNC: 11 MG/DL (ref 8–22)
BZE UR QL SCN: NEGATIVE
CALCIUM ALBUM COR SERPL-MCNC: 9 MG/DL (ref 8.5–10.5)
CALCIUM SERPL-MCNC: 8.7 MG/DL (ref 8.5–10.5)
CANNABINOIDS UR QL SCN: NEGATIVE
CHLORIDE SERPL-SCNC: 106 MMOL/L (ref 96–112)
CO2 SERPL-SCNC: 25 MMOL/L (ref 20–33)
CREAT SERPL-MCNC: 1.34 MG/DL (ref 0.5–1.4)
EKG IMPRESSION: NORMAL
EOSINOPHIL # BLD AUTO: 0.51 K/UL (ref 0–0.51)
EOSINOPHIL NFR BLD: 6.6 % (ref 0–6.9)
ERYTHROCYTE [DISTWIDTH] IN BLOOD BY AUTOMATED COUNT: 44.6 FL (ref 35.9–50)
FENTANYL UR QL: POSITIVE
GFR SERPLBLD CREATININE-BSD FMLA CKD-EPI: 70 ML/MIN/1.73 M 2
GLOBULIN SER CALC-MCNC: 2.3 G/DL (ref 1.9–3.5)
GLUCOSE SERPL-MCNC: 100 MG/DL (ref 65–99)
HCT VFR BLD AUTO: 43.6 % (ref 42–52)
HGB BLD-MCNC: 14.6 G/DL (ref 14–18)
IMM GRANULOCYTES # BLD AUTO: 0.02 K/UL (ref 0–0.11)
IMM GRANULOCYTES NFR BLD AUTO: 0.3 % (ref 0–0.9)
LYMPHOCYTES # BLD AUTO: 2.6 K/UL (ref 1–4.8)
LYMPHOCYTES NFR BLD: 33.8 % (ref 22–41)
MCH RBC QN AUTO: 31.3 PG (ref 27–33)
MCHC RBC AUTO-ENTMCNC: 33.5 G/DL (ref 32.3–36.5)
MCV RBC AUTO: 93.6 FL (ref 81.4–97.8)
METHADONE UR QL SCN: NEGATIVE
MONOCYTES # BLD AUTO: 0.74 K/UL (ref 0–0.85)
MONOCYTES NFR BLD AUTO: 9.6 % (ref 0–13.4)
NEUTROPHILS # BLD AUTO: 3.75 K/UL (ref 1.82–7.42)
NEUTROPHILS NFR BLD: 48.7 % (ref 44–72)
NRBC # BLD AUTO: 0 K/UL
NRBC BLD-RTO: 0 /100 WBC (ref 0–0.2)
OPIATES UR QL SCN: NEGATIVE
OXYCODONE UR QL SCN: POSITIVE
PCP UR QL SCN: NEGATIVE
PLATELET # BLD AUTO: 138 K/UL (ref 164–446)
PMV BLD AUTO: 12 FL (ref 9–12.9)
POTASSIUM SERPL-SCNC: 4.4 MMOL/L (ref 3.6–5.5)
PROPOXYPH UR QL SCN: NEGATIVE
PROT SERPL-MCNC: 5.9 G/DL (ref 6–8.2)
RBC # BLD AUTO: 4.66 M/UL (ref 4.7–6.1)
SODIUM SERPL-SCNC: 141 MMOL/L (ref 135–145)
WBC # BLD AUTO: 7.7 K/UL (ref 4.8–10.8)

## 2024-06-16 PROCEDURE — A9270 NON-COVERED ITEM OR SERVICE: HCPCS | Performed by: STUDENT IN AN ORGANIZED HEALTH CARE EDUCATION/TRAINING PROGRAM

## 2024-06-16 PROCEDURE — 700105 HCHG RX REV CODE 258: Performed by: STUDENT IN AN ORGANIZED HEALTH CARE EDUCATION/TRAINING PROGRAM

## 2024-06-16 PROCEDURE — 93005 ELECTROCARDIOGRAM TRACING: CPT | Performed by: INTERNAL MEDICINE

## 2024-06-16 PROCEDURE — G0378 HOSPITAL OBSERVATION PER HR: HCPCS

## 2024-06-16 PROCEDURE — 80053 COMPREHEN METABOLIC PANEL: CPT

## 2024-06-16 PROCEDURE — 85025 COMPLETE CBC W/AUTO DIFF WBC: CPT

## 2024-06-16 PROCEDURE — 36415 COLL VENOUS BLD VENIPUNCTURE: CPT

## 2024-06-16 PROCEDURE — 700102 HCHG RX REV CODE 250 W/ 637 OVERRIDE(OP): Performed by: STUDENT IN AN ORGANIZED HEALTH CARE EDUCATION/TRAINING PROGRAM

## 2024-06-16 PROCEDURE — 80307 DRUG TEST PRSMV CHEM ANLYZR: CPT

## 2024-06-16 PROCEDURE — 700111 HCHG RX REV CODE 636 W/ 250 OVERRIDE (IP): Mod: JZ | Performed by: STUDENT IN AN ORGANIZED HEALTH CARE EDUCATION/TRAINING PROGRAM

## 2024-06-16 PROCEDURE — 99239 HOSP IP/OBS DSCHRG MGMT >30: CPT | Performed by: INTERNAL MEDICINE

## 2024-06-16 PROCEDURE — 93010 ELECTROCARDIOGRAM REPORT: CPT | Performed by: INTERNAL MEDICINE

## 2024-06-16 PROCEDURE — RXMED WILLOW AMBULATORY MEDICATION CHARGE: Performed by: INTERNAL MEDICINE

## 2024-06-16 RX ORDER — HYDROCODONE BITARTRATE AND ACETAMINOPHEN 5; 325 MG/1; MG/1
1 TABLET ORAL EVERY 6 HOURS PRN
Qty: 10 TABLET | Refills: 0 | Status: SHIPPED | OUTPATIENT
Start: 2024-06-16 | End: 2024-06-20

## 2024-06-16 RX ORDER — ENOXAPARIN SODIUM 100 MG/ML
40 INJECTION SUBCUTANEOUS DAILY
Status: DISCONTINUED | OUTPATIENT
Start: 2024-06-16 | End: 2024-06-16 | Stop reason: HOSPADM

## 2024-06-16 RX ORDER — CIPROFLOXACIN 500 MG/1
500 TABLET, FILM COATED ORAL EVERY 12 HOURS
Status: DISCONTINUED | OUTPATIENT
Start: 2024-06-16 | End: 2024-06-16 | Stop reason: HOSPADM

## 2024-06-16 RX ORDER — DOXYCYCLINE 100 MG/1
100 CAPSULE ORAL 2 TIMES DAILY
Qty: 14 CAPSULE | Refills: 0 | Status: ACTIVE | OUTPATIENT
Start: 2024-06-16 | End: 2024-06-23

## 2024-06-16 RX ORDER — METRONIDAZOLE 500 MG/1
500 TABLET ORAL EVERY 12 HOURS
Status: DISCONTINUED | OUTPATIENT
Start: 2024-06-16 | End: 2024-06-16 | Stop reason: HOSPADM

## 2024-06-16 RX ADMIN — HYDROCODONE BITARTRATE AND ACETAMINOPHEN 1 TABLET: 5; 325 TABLET ORAL at 15:05

## 2024-06-16 RX ADMIN — HYDROCODONE BITARTRATE AND ACETAMINOPHEN 1 TABLET: 5; 325 TABLET ORAL at 09:53

## 2024-06-16 RX ADMIN — HYDROCODONE BITARTRATE AND ACETAMINOPHEN 1 TABLET: 5; 325 TABLET ORAL at 01:50

## 2024-06-16 RX ADMIN — VANCOMYCIN HYDROCHLORIDE 1500 MG: 5 INJECTION, POWDER, LYOPHILIZED, FOR SOLUTION INTRAVENOUS at 04:21

## 2024-06-16 RX ADMIN — PIPERACILLIN AND TAZOBACTAM 3.38 G: 3; .375 INJECTION, POWDER, FOR SOLUTION INTRAVENOUS at 04:23

## 2024-06-16 ASSESSMENT — PAIN DESCRIPTION - PAIN TYPE
TYPE: ACUTE PAIN
TYPE: ACUTE PAIN;SURGICAL PAIN
TYPE: ACUTE PAIN;SURGICAL PAIN
TYPE: ACUTE PAIN

## 2024-06-16 ASSESSMENT — ENCOUNTER SYMPTOMS
FLANK PAIN: 0
FEVER: 0
MYALGIAS: 0
CHILLS: 0

## 2024-06-16 NOTE — DISCHARGE PLANNING
Patient rolled back to observation/outpatient status per attending MD determination () and UR committee MD secondary review (Dr. Forbes).  Condition code 44 completed.

## 2024-06-16 NOTE — ASSESSMENT & PLAN NOTE
Mild elevation, has h/o mild elevations, being followed by PCP, advised to follow with PCP, improved from prior.

## 2024-06-16 NOTE — PROGRESS NOTES
Report received from PACU RN; assumed care once RN transported patient to the floor. Patient ambulated from [facility] to bed with RN/wife/CNA assisting. Assessment/2 RN skin check complete. A&O x 4. VSS. 94% on RA. Patient denying SOB, numbness, nausea, vomiting, dizziness, pain. Tingling reported to left groin site. Abdomen round. Hypoactive BS x 4 noted shortly after arrival to floor. - eructation. - flatus. LBM PTA. Patient tolerating regular diet, eating dinner from late tray. Patient up x 1 with assist with slow, steady gait noted. Groin site dressing, CDI. IV Vancomycin finished. Partial dose Unasyn stopped d/t water sound in right ear and hives noted to back of neck. MD notified. Discussed plan of care with patient. All questions answered.  Low fall risk. Bed in locked/lowest position.  Call light/personal belongings within reach.  All needs met, patient noted eating at end of shift.

## 2024-06-16 NOTE — HOSPITAL COURSE
54-year-old male with no significant past medical history who presented 6/15 with groin area pain.  Started couple days with a small wound and got worse, with the swelling and erythema.  No significant fever or chills no other symptoms.  On admission vital signs were stable with no leukocytosis, patient was found to have scrotal abscess, patient was evaluated by urology team and patient underwent I&D, patient received IV antibiotics Zosyn.  His symptoms were improved, patient willing to be discharged, all instruction and packing instruction was provided by urology team, scheduled in 2 weeks to follow-up, patient will be discharged on ciprofloxacin with Flagyl since he is allergic to

## 2024-06-16 NOTE — DISCHARGE SUMMARY
Discharge Summary    CHIEF COMPLAINT ON ADMISSION  Chief Complaint   Patient presents with    Abscess     Left groin, since Monday.        Reason for Admission  Scrotal abscess needed I&D    Admission Date  6/15/2024    CODE STATUS  Full Code    HPI & HOSPITAL COURSE    61-year-old male with no significant past medical history who presented 6/15 with groin area pain.  Started couple days with a small wound and got worse, with the swelling and erythema.  No significant fever or chills no other symptoms.  On admission vital signs were stable with no leukocytosis, patient was found to have scrotal abscess, patient was evaluated by urology team and patient underwent I&D, patient received IV antibiotics Zosyn.  His symptoms were improved, patient willing to be discharged, all instruction and packing instruction was provided by urology team, scheduled in 2 weeks to follow-up, patient will be discharged on doxycycline to follow-up with urology and PCP as outpatient, on the day of discharge the patient denied any fever or chills, no leukocytosis, incisions clean with no significant drainage, urology signed off, patient will be discharged on doxycycline to follow-up with urology team in 2 weeks.  All plan of care was discussed with the patient, all his questions were answered.    Therefore, he is discharged in good and stable condition to home with close outpatient follow-up.    The patient recovered much more quickly than anticipated on admission.    Discharge Date  06/16/24      FOLLOW UP ITEMS POST DISCHARGE  Follow-up with PCP and neurology in 2 weeks    DISCHARGE DIAGNOSES  Principal Problem:    Scrotal abscess (POA: Yes)  Active Problems:    Transaminitis (POA: Unknown)    Thrombocytopenia (HCC) (POA: Unknown)  Resolved Problems:    * No resolved hospital problems. *      FOLLOW UP  Future Appointments   Date Time Provider Department Center   6/17/2024  2:20 PM Saad Geller M.D. [facility]  LAI Geller M.D.  [id number] [address]  [phone]    Follow up in 1 week(s)        MEDICATIONS ON DISCHARGE     Medication List        START taking these medications        Instructions   acetaminophen 500 MG Tabs  Commonly known as: Tylenol   Take 500-1,000 mg by mouth every 6 hours as needed.  Dose: 500-1,000 mg     benzonatate 200 MG capsule  Commonly known as: Tessalon   Take 1 Capsule by mouth 3 times a day as needed for Cough.  Dose: 200 mg     doxycycline 100 MG capsule  Commonly known as: Monodox   Take 1 Capsule by mouth 2 times a day for 7 days.  Dose: 100 mg     ibuprofen 200 MG Tabs  Commonly known as: Motrin   Take 200 mg by mouth every 6 hours as needed.  Dose: 200 mg              Allergies  Allergies   Allergen Reactions    Unasyn [Ampicillin-Sulbactam Sodium] Hives       DIET  Orders Placed This Encounter   Procedures    Diet Order Diet: Regular     Standing Status:   Standing     Number of Occurrences:   1     Order Specific Question:   Diet:     Answer:   Regular [1]       ACTIVITY  As tolerated.  Weight bearing as tolerated    CONSULTATIONS  Urology    PROCEDURES  I&D    LABORATORY  Lab Results   Component Value Date    SODIUM 141 06/16/2024    POTASSIUM 4.4 06/16/2024    CHLORIDE 106 06/16/2024    CO2 25 06/16/2024    GLUCOSE 100 (H) 06/16/2024    BUN 11 06/16/2024    CREATININE 1.34 06/16/2024        Lab Results   Component Value Date    WBC 7.7 06/16/2024    HEMOGLOBIN 14.6 06/16/2024    HEMATOCRIT 43.6 06/16/2024    PLATELETCT 138 (L) 06/16/2024        Total time of the discharge process exceeds 36 minutes.

## 2024-06-16 NOTE — ASSESSMENT & PLAN NOTE
Mild persistent thrombocytopenia, no bleeding, bruising, noted on labs.  Advised to follow with PCP as well.

## 2024-06-16 NOTE — H&P
Hospital Medicine History & Physical Note    Date of Service  6/15/2024    Primary Care Physician  Saad Geller M.D.    Consultants  urology    Specialist Names: Dr. Lee    Code Status  Full Code    Chief Complaint  Chief Complaint   Patient presents with    Abscess     Left groin, since Monday.        History of Presenting Illness  Roman Magana is a 34 y.o. male with h/o mild transaminitis and mild thrombocytopenia who presented 6/15/2024 with concerns for scrotal abscess.    Patient presented with groin/scrotum pain, now severe, progressive over the last week. Started as a small area of swelling, progressed now more swollen with erythema. Initially thought it was a bug bite with irritation. Pain progressive thus prompting medical evaluation. No fevers, chills, vision changes, headaches, chest pain dyspnea, cough, N/V/D/C. He was a little hypertensive, labs reassuring, not septic. ERP discussed with Dr. Lee from urology who evaluated and with take patient to OR for I&D.     I discussed the plan of care with patient, family, bedside RN, charge RN, , and pharmacy.    Review of Systems  Review of Systems   Constitutional:  Negative for chills and fever.   HENT:  Negative for congestion.    Eyes:  Negative for blurred vision and double vision.   Respiratory:  Negative for cough, shortness of breath and stridor.    Cardiovascular:  Negative for chest pain and palpitations.   Gastrointestinal:  Negative for nausea and vomiting.   Genitourinary:  Negative for dysuria and urgency.        Lateral Scrotal pain, swelling, erythema    Musculoskeletal:  Negative for falls and joint pain.   Neurological:  Negative for sensory change, speech change and focal weakness.   Psychiatric/Behavioral:  Negative for substance abuse. The patient is not nervous/anxious.        Past Medical History   has a past medical history of Migraine.    Surgical History   has a past surgical history that includes  appendectomy and lumbar laminectomy diskectomy (2018).     Family History  family history includes Cancer in his mother; Diabetes in his maternal grandfather; Heart Disease in his maternal grandfather; No Known Problems in his father.     Social History   reports that he quit smoking about 9 years ago. His smoking use included cigarettes. He has never used smokeless tobacco. He reports current alcohol use of about 3.6 oz of alcohol per week. He reports that he does not use drugs.    Allergies  No Known Allergies    Medications  Prior to Admission Medications   Prescriptions Last Dose Informant Patient Reported? Taking?   acetaminophen (TYLENOL) 500 MG Tab   Yes No   Sig: Take 500-1,000 mg by mouth every 6 hours as needed.   Patient not taking: Reported on 6/15/2024   benzonatate (TESSALON) 200 MG capsule   No No   Sig: Take 1 Capsule by mouth 3 times a day as needed for Cough.   Patient not taking: Reported on 6/15/2024   ibuprofen (MOTRIN) 200 MG Tab   Yes No   Sig: Take 200 mg by mouth every 6 hours as needed.   Patient not taking: Reported on 6/15/2024      Facility-Administered Medications: None       Physical Exam  Temp:  [35.7 °C (96.3 °F)-36.9 °C (98.4 °F)] 36.2 °C (97.2 °F)  Pulse:  [67-86] 69  Resp:  [12-20] 15  BP: ()/(53-94) 109/65  SpO2:  [91 %-98 %] 94 %  Blood Pressure: 109/65   Temperature: 36.2 °C (97.2 °F)   Pulse: 69   Respiration: 15   Pulse Oximetry: 94 %     EXAM WAS PRIOR TO SURGERY  Physical Exam  Vitals and nursing note reviewed. Exam conducted with a chaperone present.   Constitutional:       Appearance: He is not toxic-appearing.   HENT:      Head: Normocephalic and atraumatic.      Nose: Nose normal. No rhinorrhea.      Mouth/Throat:      Mouth: Mucous membranes are moist.      Pharynx: Oropharynx is clear.   Eyes:      General: No scleral icterus.     Extraocular Movements: Extraocular movements intact.      Conjunctiva/sclera: Conjunctivae normal.   Cardiovascular:      Rate and  "Rhythm: Normal rate and regular rhythm.      Heart sounds: Normal heart sounds.   Pulmonary:      Effort: Pulmonary effort is normal. No respiratory distress.      Breath sounds: Normal breath sounds. No wheezing, rhonchi or rales.   Abdominal:      General: There is no distension.      Palpations: Abdomen is soft.      Tenderness: There is no abdominal tenderness. There is no guarding or rebound.   Genitourinary:     Comments: Superior lateral left scrotal swelling, erythema, tenderness, no open wound  Musculoskeletal:         General: No tenderness or deformity. Normal range of motion.      Cervical back: Normal range of motion and neck supple. No rigidity.      Right lower leg: No edema.      Left lower leg: No edema.   Skin:     General: Skin is warm and dry.      Capillary Refill: Capillary refill takes less than 2 seconds.      Findings: No rash.   Neurological:      General: No focal deficit present.      Mental Status: He is alert and oriented to person, place, and time. Mental status is at baseline.      Cranial Nerves: No cranial nerve deficit.      Sensory: No sensory deficit.      Motor: No weakness.      Coordination: Coordination normal.   Psychiatric:         Mood and Affect: Mood normal.         Behavior: Behavior normal.         Thought Content: Thought content normal.         Judgment: Judgment normal.         Laboratory:  Recent Labs     06/15/24  1150   WBC 7.6   RBC 5.08   HEMOGLOBIN 16.2   HEMATOCRIT 46.2   MCV 90.9   MCH 31.9   MCHC 35.1   RDW 41.8   PLATELETCT 152*   MPV 12.5     Recent Labs     06/15/24  1150   SODIUM 142   POTASSIUM 4.3   CHLORIDE 106   CO2 24   GLUCOSE 96   BUN 10   CREATININE 1.03   CALCIUM 8.7     Recent Labs     06/15/24  1150   ALTSGPT 79*   ASTSGOT 36   ALKPHOSPHAT 70   TBILIRUBIN 0.5   GLUCOSE 96         No results for input(s): \"NTPROBNP\" in the last 72 hours.      No results for input(s): \"TROPONINT\" in the last 72 hours.    Imaging:  [id number]   Final " Result      1.  Borderline mild left varicocele with individual pampiniform plexus vein diameter up to 2.6 mm.          no X-Ray or EKG requiring interpretation    Assessment/Plan:  Justification for Admission Status  I anticipate this patient will require at least two midnights for appropriate medical management, necessitating inpatient admission because Scrotal abscess requiring I&D      * Scrotal abscess- (present on admission)  Assessment & Plan  Area of swelling, redness, pain in superior lateral scrotum.   Went to OR with urology Dr. Lee for I&D, cultures sent, sebaceous cyst notes in wall of abscess, removed.   Wound packed.  Continue antibiotics, follow cultures and urology recommendations.   Pain control.     Thrombocytopenia (HCC)  Assessment & Plan  Mild persistent thrombocytopenia, no bleeding, bruising, noted on labs.  Advised to follow with PCP as well.    Transaminitis  Assessment & Plan  Mild elevation, has h/o mild elevations, being followed by PCP, advised to follow with PCP, improved from prior.          VTE prophylaxis: SCDs/TEDs  Total time spent on admission 78 minutes.    This included my review with ER physician, review of ED events, patient's history, outside records, recent records, face to face interview, physical examination; my review of lab results (CBC, chemistry panel), imaging review (CXR) and ECG. Also includes counseling patient on admission, treatment plan, and documentation of encounter.  In addition, speaking with specialist Dr. Billnigsley

## 2024-06-16 NOTE — PROGRESS NOTES
Pharmacy Vancomycin Kinetics Note for 6/15/2024     34 y.o. male on Vancomycin Day # 1     Vancomycin Indication (AUC Dosing): Skin/skin structure infection    Provider specified end date:  (TBD)    Active Antibiotics (From admission, onward)      Ordered     Ordering Provider       Sat Davie 15, 2024  5:54 PM    06/15/24 1754  vancomycin (Vancocin) 1,500 mg in  mL IVPB  (vancomycin (VANCOCIN) IV (LD + Maintenance))  EVERY 12 HOURS         Sebastián Jules M.D.       Sat Davie 15, 2024  2:08 PM    06/15/24 1408  ampicillin/sulbactam (Unasyn) 3 g in  mL IVPB  EVERY 6 HOURS         Sebastián Jules M.D.    06/15/24 1408  MD Alert...Vancomycin per Pharmacy  (MD Alert...Vancomycin per Pharmacy)  PHARMACY TO DOSE        Question:  Indication(s) for vancomycin?  Answer:  Skin and soft tissue infection    Sebastián Jules M.D.       Sat Davie 15, 2024 11:47 AM    06/15/24 1147  vancomycin (Vancocin) 2,500 mg in  mL IVPB  (vancomycin (VANCOCIN) IV (LD + Maintenance))  ONCE         Mesfin Billingsley M.D.          Dosing Weight: 102 kg (224 lb 13.9 oz)    Admission History: Admitted on 6/15/2024 for Scrotal abscess [N49.2]  Pertinent history: Scrotal Abscess with MRSA nares & culture in process    Allergies: Patient has no known allergies.     Pertinent cultures to date:   Results       Procedure Component Value Units Date/Time    MRSA By PCR (Amp) [[id number]]     Order Status: No result Specimen: Respirate from Nares     CULTURE WOUND W/ GRAM STAIN [[id number]] Collected: 06/15/24 1520    Order Status: No result Specimen: Wound Updated: 06/15/24 1731     Significant Indicator NEG     Source WND     Site Scrotal Abscess pus     Culture Result -     Gram Stain Result -    Narrative:      Surgery - swabs received    Anaerobic Culture [[id number]] Collected: 06/15/24 1520    Order Status: No result Specimen: Wound Updated: 06/15/24 1731     Significant Indicator NEG     Source WND     Site Scrotal Abscess pus      Culture Result -    Narrative:      Surgery - swabs received    Urinalysis [[id number]] Collected: 06/15/24 1255    Order Status: Completed Specimen: Urine Updated: 06/15/24 1320     Color Yellow     Character Clear     Specific Gravity 1.006     Ph 6.0     Glucose Negative mg/dL      Ketones Negative mg/dL      Protein Negative mg/dL      Bilirubin Negative     Urobilinogen, Urine 0.2     Nitrite Negative     Leukocyte Esterase Negative     Occult Blood Negative     Micro Urine Req see below     Comment: Microscopic examination not performed when specimen is clear  and chemically negative for protein, blood, leukocyte esterase  and nitrite.         Urine Culture (New) [[id number]] Collected: 06/15/24 1255    Order Status: Sent Specimen: Urine Updated: 06/15/24 1314    Blood Culture - Draw one from central line and one from peripheral site [[id number]] Collected: 06/15/24 1242    Order Status: Sent Specimen: Blood from Line Updated: 06/15/24 1312    Blood Culture - Draw one from central line and one from peripheral site [[id number]] Collected: 06/15/24 1152    Order Status: Sent Specimen: Blood from Peripheral Updated: 06/15/24 1229    MRSA By PCR (Amp) [[id number]]     Order Status: Canceled Specimen: Respirate from Nares           Labs: Estimated Creatinine Clearance: 122.6 mL/min (by C-G formula based on SCr of 1.03 mg/dL).    Recent Labs     06/15/24  1150   WBC 7.6   NEUTSPOLYS 56.20     Recent Labs     06/15/24  1150   BUN 10   CREATININE 1.03   ALBUMIN 4.3       Intake/Output Summary (Last 24 hours) at 6/15/2024 1755  Last data filed at 6/15/2024 1533  Gross per 24 hour   Intake 500 ml   Output --   Net 500 ml      /66   Pulse 62   Temp 36.3 °C (97.3 °F) (Temporal)   Resp 15   Ht 1.829 m (6')   Wt 102 kg (225 lb 12 oz)   SpO2 96%  Temp (24hrs), Av.2 °C (97.1 °F), Min:35.7 °C (96.3 °F), Max:36.4 °C (97.6 °F)    List concerns for Vancomycin clearance: Obesity    Pharmacokinetics:   AUC kinetics:   Ke  (hr ^-1): 0.1062 hr^-1  Half life: 6.53 hr  Clearance: 7.153  Estimated TDD: 3576.5  Estimated Dose: 1267  Estimated interval: 8.5    A/P:   -  Vancomycin dose: Loading dose 2,500 mg (25 mg/kg) IV x one followed by 1,500 mg (14 mg/kg) IV q12h starting tomorrow (0400/1600).     -  Next vancomycin level(s): TBD pending continuation of therapy     -  Predicted vancomycin AUC from initial AUC test calculator: 419 mg·hr/L    Gabby Parrish PharmD, BCPS

## 2024-06-16 NOTE — PROGRESS NOTES
Patient discharged home per MD orders. Patient ambulating without assistance, on RA saturating >90%. I&D discharge education provided, incisional care, follow up appointments, medication safety, patient demonstrated and verbalized understanding. All questions answered. Tolerating diet. Voiding without difficulty. IV removed. All patient belongings with patient at this time. Patient being escorted out of building via WC. Patient educated to call MD or return to ED for concerns.

## 2024-06-16 NOTE — PROGRESS NOTES
UPDATE:  Notified by bedside RN shortly after hanging second bag of Unasyn post op he developed hives to the back of his neck. Unasyn stopped, benadryl ordered.   Discussed with pharmacy, will switch to zosyn for now.  MRSA swab still pending, if negative can DC vancomycin.     Sebastián Jules M.D.

## 2024-06-16 NOTE — PROGRESS NOTES
Note to reader: this note follows the APSO format rather than the historical SOAP format. Assessment and plan located at the top of the note for ease of use.    Chief Complaint  34 y.o. year old male here with     Assessment/Plan  Interval History   Scrotal abscess  - Antibiotics per hospitalist  - Wound packing instruction provided and wound packing changed   - Advised patient he will need to change packing each day until f/u  - Our office will schedule f/u in 2 weeks for wound check with Dr. Lee    Urology signing off. Call with questions or concerns.    Case discussed with patient and Urology - Dr. Lee.    Patient seen and examined    6/16. Patient resting comfortably in bed. He reports ongoing pain in the left scrotum where the abscess was drained. Reports pain is improving. Packing exchanged and teaching for wound packing technique provided.         Review of Systems  Physical Exam   Review of Systems   Constitutional:  Negative for chills and fever.   Genitourinary:  Negative for dysuria, flank pain, frequency, hematuria and urgency.   Musculoskeletal:  Negative for myalgias.     Vitals:    06/15/24 2159 06/16/24 0004 06/16/24 0422 06/16/24 0859   BP: 115/70 104/64 116/68 133/82   Pulse: 77 78 64 74   Resp: 18 17 17 15   Temp: 36.6 °C (97.9 °F) 36.5 °C (97.7 °F) 36.4 °C (97.5 °F) 36.5 °C (97.7 °F)   TempSrc: Temporal Temporal Temporal Temporal   SpO2: 98% 97% 96% 97%   Weight:       Height:         Physical Exam  Vitals and nursing note reviewed.   Constitutional:       Appearance: Normal appearance.   HENT:      Head: Normocephalic.   Pulmonary:      Effort: Pulmonary effort is normal.   Abdominal:      General: Abdomen is flat. There is no distension.      Palpations: Abdomen is soft.      Tenderness: There is no abdominal tenderness. There is no guarding.   Genitourinary:     Penis: Normal.       Comments: Approximately 2 cm L scrotal incision with iodoform packing present. No bleeding, mild  induration surrounding incision. No purulent drainage.  Skin:     General: Skin is warm and dry.      Capillary Refill: Capillary refill takes less than 2 seconds.   Neurological:      Mental Status: He is alert.   Psychiatric:         Mood and Affect: Mood normal.         Behavior: Behavior normal.          Hematology Chemistry   Lab Results   Component Value Date/Time    WBC 7.7 06/16/2024 03:43 AM    HEMOGLOBIN 14.6 06/16/2024 03:43 AM    HEMATOCRIT 43.6 06/16/2024 03:43 AM    PLATELETCT 138 (L) 06/16/2024 03:43 AM     Lab Results   Component Value Date/Time    SODIUM 141 06/16/2024 03:43 AM    POTASSIUM 4.4 06/16/2024 03:43 AM    CHLORIDE 106 06/16/2024 03:43 AM    CO2 25 06/16/2024 03:43 AM    GLUCOSE 100 (H) 06/16/2024 03:43 AM    BUN 11 06/16/2024 03:43 AM    CREATININE 1.34 06/16/2024 03:43 AM         Labs not explicitly included in this progress note were reviewed by the author.   Radiology/imaging not explicitly included in this progress note was reviewed by the author.     Medications reviewed and Labs reviewed

## 2024-06-16 NOTE — DISCHARGE INSTRUCTIONS
Scrotal abscess per Dr. Lee.   - Antibiotics per hospitalist  - Wound packing instruction provided and wound packing changed   - Advised patient he will need to change packing each day until follow up  - Our office will schedule f/u in 2 weeks for wound check with Dr. Lee    Cleanse site. Remove and replace Iodofoam packing. Cover with dry gauze. Wear athletic support. Perform daily until follow up with Dr. Lee.

## 2024-06-16 NOTE — CARE PLAN
The patient is Stable - Low risk of patient condition declining or worsening    Shift Goals  Clinical Goals: Dressing change, IV ABX, pain control, mobility, urine sample  Patient Goals: Pain control, discharge  Family Goals: Comfort    Progress made toward(s) clinical / shift goals:  Dressing changed by MD. Instructions provided to patient/wife; verbalized understanding. Norco effective for pain. Patient ambulating to/from bathroom, steady gait noted. + BM, tolerating diet. Urine sample sent. EKG performed. Patient informed of discharge. Patient tolerated PIV removal. Patient ready for discharge.     Patient is not progressing towards the following goals: NA.

## 2024-06-16 NOTE — DISCHARGE PLANNING
Care Transition Team Discharge Planning    Anticipated Discharge Information  Discharge Disposition: Discharged to home/self care (01)              Discharge Plan:  Home with close OP follow up    This RN CM met with Pt and served Form 2 for Code 44.   This RN CM answered Pt's questions.   Pr chart Pt is now on oral antibiotics.  Discharge plan is home with close OP follow up

## 2024-06-16 NOTE — ASSESSMENT & PLAN NOTE
Area of swelling, redness, pain in superior lateral scrotum.   Went to OR with urology Dr. Lee for I&D, cultures sent, sebaceous cyst notes in wall of abscess, removed.   Wound packed.  Continue antibiotics, follow cultures and urology recommendations.   Pain control.

## 2024-06-16 NOTE — CARE PLAN
The patient is Stable - Low risk of patient condition declining or worsening    Shift Goals  Clinical Goals: Pain control, IV ABX, 2 RN skin check, mobility, rest  Patient Goals: Pain control, comfort, discharge  Family Goals: Comfort    Progress made toward(s) clinical / shift goals:  Patient denied Pain. IV ABX to be changed after Hives noted to neck. Patient tolerated IV Vancomycin. 2 RN skin check completed. Patient ambulated from gurney to bed, slow steady gait noted.     Patient is not progressing towards the following goals: NA.

## 2024-06-16 NOTE — CARE PLAN
Problem: Knowledge Deficit - Standard  Goal: Patient and family/care givers will demonstrate understanding of plan of care, disease process/condition, diagnostic tests and medications  Description: Target End Date:  1-3 days or as soon as patient condition allows    Document in Patient Education    1.  Patient and family/caregiver oriented to unit, equipment, visitation policy and means for communicating concern  2.  Complete/review Learning Assessment  3.  Assess knowledge level of disease process/condition, treatment plan, diagnostic tests and medications  4.  Explain disease process/condition, treatment plan, diagnostic tests and medications  Outcome: Progressing   The patient is Stable - Low risk of patient condition declining or worsening    Shift Goals  Clinical Goals: Abx, rest  Patient Goals: rest  Family Goals: Comfort    Progress made toward(s) clinical / shift goals:  plan of care discussed with the pt and verbalized understanding.

## 2024-06-16 NOTE — PROGRESS NOTES
2 RN skin check complete.   Devices in place: Left groin athletic cup, PIV right forearm.   Skin assessed under devices: above as much as possible.     Dry gauze to left groin, packing beneath gauze. Athletic support in place. Scattered tattoos. No other skin issues/demarcations/noted/reported.     Confirmed pressure ulcers found on: NA.   New potential pressure ulcers noted on: NA.   Wound consult placed: NA.    The following interventions in place: Blankets in place. Pillows for offloading.

## 2024-06-17 ENCOUNTER — APPOINTMENT (OUTPATIENT)
Dept: MEDICAL GROUP | Facility: LAB | Age: 34
End: 2024-06-17
Payer: COMMERCIAL

## 2024-06-17 LAB
BACTERIA UR CULT: NORMAL
SIGNIFICANT IND 70042: NORMAL
SITE SITE: NORMAL
SOURCE SOURCE: NORMAL

## 2024-06-18 LAB
BACTERIA WND AEROBE CULT: NORMAL
GRAM STN SPEC: NORMAL
SIGNIFICANT IND 70042: NORMAL
SITE SITE: NORMAL
SOURCE SOURCE: NORMAL

## 2024-06-19 ENCOUNTER — OFFICE VISIT (OUTPATIENT)
Dept: MEDICAL GROUP | Facility: LAB | Age: 34
End: 2024-06-19
Payer: COMMERCIAL

## 2024-06-19 VITALS
TEMPERATURE: 96.8 F | BODY MASS INDEX: 30.75 KG/M2 | SYSTOLIC BLOOD PRESSURE: 118 MMHG | RESPIRATION RATE: 20 BRPM | DIASTOLIC BLOOD PRESSURE: 72 MMHG | WEIGHT: 227 LBS | HEIGHT: 72 IN | OXYGEN SATURATION: 98 %

## 2024-06-19 DIAGNOSIS — N49.2 ABSCESS OF SCROTUM: ICD-10-CM

## 2024-06-19 LAB
BACTERIA SPEC ANAEROBE CULT: ABNORMAL
SIGNIFICANT IND 70042: ABNORMAL
SITE SITE: ABNORMAL
SOURCE SOURCE: ABNORMAL

## 2024-06-19 PROCEDURE — 99213 OFFICE O/P EST LOW 20 MIN: CPT | Performed by: FAMILY MEDICINE

## 2024-06-19 PROCEDURE — 3074F SYST BP LT 130 MM HG: CPT | Performed by: FAMILY MEDICINE

## 2024-06-19 PROCEDURE — 3078F DIAST BP <80 MM HG: CPT | Performed by: FAMILY MEDICINE

## 2024-06-19 ASSESSMENT — FIBROSIS 4 INDEX: FIB4 SCORE: 0.87

## 2024-06-19 NOTE — LETTER
June 19, 2024       Patient: Roman Magana   YOB: 1990   Date of Visit: 6/19/2024         To Whom It May Concern:    In my medical opinion, I recommend that Roman Magana remain out of work until Monday, June 24th. When he returns to work I recommend light duty with desk work only until he follows up with the specialist on July 3rd. 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      If you have any questions or concerns, please don't hesitate to call [phone]          Sincerely,          Saad Geller M.D.

## 2024-06-19 NOTE — PROGRESS NOTES
Verbal consent was acquired by the patient to use [employer] ambient listening note generation during this visit Yes    Subjective:   Roman Magana is a 34 y.o. male here today for   Chief Complaint   Patient presents with    [facility]     History of Present Illness  The patient is a 34-year-old male here today with concerns. The patient is here today for follow-up after hospitalization on 06/15/2024 through 06/16/2024 where he was diagnosed with scrotal abscess. The patient is status post I & D, who was discharged on doxycycline with instructions to follow up with PCP as well as with urologist, Dr. Justice Lee.    The patient reports an improvement in his condition, however, he continues to experience intermittent pain. He has been managing the pain with ibuprofen, which he reports has been effective in managing his pain. He continues to take doxycycline, which continues to drain. He is uncertain if the infection has worsened or if the antibiotics are being administered. He has been attempting to balance his antibiotic intake with probiotics, which have provided some relief for his gut health. He reports feeling unwell today, accompanied by nasal congestion. He denies any increase in redness, tenderness, or swelling.  Patient has been packing wound regularly.  He does notice continued purulent discharge.  Denies any systemic symptoms of fevers, chills, myalgias, rigors.  His next scheduled appointment with Dr. Tatum is scheduled for 07/03/2024.      Allergies   Allergen Reactions    Unasyn [Ampicillin-Sulbactam Sodium] Hives         Current medicines (including changes today)  Current Outpatient Medications   Medication Sig Dispense Refill    doxycycline (MONODOX) 100 MG capsule Take 1 Capsule by mouth 2 times a day for 7 days. 14 Capsule 0    HYDROcodone-acetaminophen (NORCO) 5-325 MG Tab per tablet Take 1 Tablet by mouth every 6 hours as needed (severe pain) for up  to 4 days. 10 Tablet 0    ibuprofen (MOTRIN) 200 MG Tab Take 200 mg by mouth every 6 hours as needed.      acetaminophen (TYLENOL) 500 MG Tab Take 500-1,000 mg by mouth every 6 hours as needed.      benzonatate (TESSALON) 200 MG capsule Take 1 Capsule by mouth 3 times a day as needed for Cough. 30 Capsule 0     No current facility-administered medications for this visit.     He  has a past medical history of Migraine.    ROS   ROS  -See HPI     Objective:     Physical Exam:  /72 (BP Location: Right arm, Patient Position: Sitting, BP Cuff Size: Adult)   Temp 36 °C (96.8 °F) (Temporal)   Resp 20   Ht 1.829 m (6')   Wt 103 kg (227 lb)   SpO2 98%  Body mass index is 30.79 kg/m².  Constitutional: Alert, no distress, well-groomed.  Skin: No rashes in visible areas.  Eye: Round. Conjunctiva clear, lids normal. No icterus.   ENMT: Lips pink without lesions, good dentition, moist mucous membranes. Phonation normal.  Neck: No masses, no thyromegaly. Moves freely without pain.  Respiratory: Unlabored respiratory effort, no cough or audible wheeze  Psych: Alert and oriented x3, normal affect and mood.    Results      Assessment and Plan:     Assessment & Plan  1. Scrotal abscess follow-up.  Chronic condition, stable.  Patient continues to have some symptoms but is improving.  Wound remains open and is draining appropriately.  The patient was advised to monitor for any signs of infection such as swelling, warmth, or tenderness to touch. The continuation of doxycycline was advised.  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  Patient will follow up with urology to the next 2 weeks.  Follow-up with PCP as needed.    Orders:  1. Abscess of scrotum        Followup: No follow-ups on file.         PLEASE NOTE: This dictation was created using voice recognition and [employer] ambient listening software. I have made every reasonable attempt  to correct obvious errors, but I expect that there are errors of grammar and possibly content that I did not discover before finalizing the note.

## 2024-06-20 LAB
BACTERIA BLD CULT: NORMAL
BACTERIA BLD CULT: NORMAL
SIGNIFICANT IND 70042: NORMAL
SIGNIFICANT IND 70042: NORMAL
SITE SITE: NORMAL
SITE SITE: NORMAL
SOURCE SOURCE: NORMAL
SOURCE SOURCE: NORMAL

## 2025-05-30 ENCOUNTER — OFFICE VISIT (OUTPATIENT)
Dept: URGENT CARE | Facility: PHYSICIAN GROUP | Age: 35
End: 2025-05-30
Payer: COMMERCIAL

## 2025-05-30 VITALS
RESPIRATION RATE: 18 BRPM | OXYGEN SATURATION: 97 % | BODY MASS INDEX: 30.4 KG/M2 | SYSTOLIC BLOOD PRESSURE: 132 MMHG | WEIGHT: 224.43 LBS | HEART RATE: 89 BPM | DIASTOLIC BLOOD PRESSURE: 88 MMHG | TEMPERATURE: 97 F | HEIGHT: 72 IN

## 2025-05-30 DIAGNOSIS — T14.8XXA INFECTED WOUND: Primary | ICD-10-CM

## 2025-05-30 DIAGNOSIS — L08.9 INFECTED WOUND: Primary | ICD-10-CM

## 2025-05-30 RX ORDER — DOXYCYCLINE HYCLATE 100 MG
100 TABLET ORAL 2 TIMES DAILY
Qty: 10 TABLET | Refills: 0 | Status: SHIPPED | OUTPATIENT
Start: 2025-05-30 | End: 2025-06-04

## 2025-05-30 ASSESSMENT — FIBROSIS 4 INDEX: FIB4 SCORE: 0.9

## 2025-05-30 NOTE — PROGRESS NOTES
Chief Complaint   Patient presents with    Wound Infection     (L) arm below armpit. States began as rash, reddening of skin, then popped like a pimple X 2 weeks ago. Now has developed into a black spot and is a small lump. Has concerns about possible abscess.          History of Present Illness  The patient is a 35-year-old male who presents for evaluation of a rash in his left armpit.    He reports the onset of a a rash like feeling on his left arm but no rash present approximately 2 weeks ago, characterized by itching and irritation. Initially, there were no visible signs such as redness or bumps. During a camping trip last weekend, he scratched the area, resulting in the formation of a like lesion. Since then, the lesion has not been healing properly and has been causing significant discomfort. He has not experienced any fevers or body aches. The pain is localized to the back of his arm and does not radiate into his armpit or back. He also reports no chest pain or burning sensation. He recalls a similar incident in the past where he ignored a similar symptom and eventually required surgical intervention. He has been using Band-Aids to keep the area clean but experienced skin tearing upon removal of the Band-Aid. He has sensitive skin and developed a rash from regular Band-Aids. He has been documenting the progression of the lesion through photographs. He reports no drainage from the lesion. He has a history of chickenpox in childhood.    He also reports a history of an allergic reaction to UNASYN, which manifested as hives and itching.         ROS:      As otherwise stated in HPI    Medical/SX/ Social History:  Reviewed per chart    Pertinent Medications:    Medications Ordered Prior to Encounter[1]     Allergies:    Unasyn [ampicillin-sulbactam sodium]     Problem list, medications, and allergies reviewed by myself today in Epic     Physical Exam:    Vitals:    05/30/25 0902   BP: 132/88   Pulse: 89   Resp: 18    Temp: 36.1 °C (97 °F)   SpO2: 97%             Physical Exam  Constitutional:       Appearance: Normal appearance. He is not ill-appearing or toxic-appearing.   HENT:      Head: Normocephalic and atraumatic.   Skin:         Neurological:      Mental Status: He is alert.            Medical Decision making and plan :  I personally reviewed prior external notes and test results pertinent to today's visit. Pt is clinically stable at today's acute urgent care visit.  Patient appears nontoxic with no acute distress noted. Appropriate for outpatient care at this time.    Pleasant 35 y.o. male presented clinic with:     Assessment & Plan  1. Wound left arm     - The black spot in the center is indicative of necrotic tissue, commonly associated with spider bites. There is no evidence of an abscess that could be drained at this time. The surrounding area exhibits redness, but it does not appear to be severely infected.  - A course of doxycycline 100 mg, to be taken twice daily for 5 days, has been prescribed. He has been advised to monitor the condition closely and return for drainage if it begins to resemble a pimple with a soft, squishy center.  - A referral to wound care has been made due to the potential need for debridement. If the condition worsens, he should attend the wound care appointment. If the wound heals well, continues to scab, and the pain subsides, the wound care appointment will not be necessary.        Shared decision-making was utilized with patient for treatment plan. Medication discussed included indication for use and the potential benefits and side effects. Education was provided regarding the aforementioned assessments.  Differential Diagnosis, natural history, and supportive care discussed. All of the patient's questions were answered to their satisfaction at the time of discharge. Patient was encouraged to monitor symptoms closely. Those signs and symptoms which would warrant concern and mandate  seeking a higher level of service through the emergency department discussed at length.  Patient stated agreement and understanding of this plan of care.    Disposition:  Home in stable condition     Voice Recognition Disclaimer:  Portions of this document were created using voice recognition software and [employer] technology provided by [facility].  Patient was informed of [email or web address] technology being used.  The software does have a chance of producing errors of grammar and possibly content. I have made every reasonable attempt to correct obvious errors, but there could be errors of grammar and possibly content that I did not discover before finalizing the documentation.    Maria E Underwood, [email or web address].        [1]   Current Outpatient Medications on File Prior to Visit   Medication Sig Dispense Refill    ibuprofen (MOTRIN) 200 MG Tab Take 200 mg by mouth every 6 hours as needed.      acetaminophen (TYLENOL) 500 MG Tab Take 500-1,000 mg by mouth every 6 hours as needed.       No current facility-administered medications on file prior to visit.

## 2025-06-10 NOTE — Clinical Note
REFERRAL APPROVAL NOTICE         Sent on Mechelle 10, 2025                   Roman Magana  [facility] [facility] [address]                   Dear Mr. Magana,    After a careful review of the medical information and benefit coverage, [facility] has processed your referral. See below for additional details.    If applicable, you must be actively enrolled with your insurance for coverage of the authorized service. If you have any questions regarding your coverage, please contact your insurance directly.    REFERRAL INFORMATION   Referral #:  [id number]  Referred-To Department    Referred-By Provider:  Wound Care    NORY Peres   [facility]      [address] [address]  [phone] [address] [place] [id number]  [phone]    Referral Start Date:  05/30/2025  Referral End Date:   05/30/2026             SCHEDULING  If you do not already have an appointment, please call [phone] to make an appointment.     MORE INFORMATION  If you do not already have a [facility] account, sign up at: [email or web address]  You can access your medical information, make appointments, see lab results, billing information, and more.  If you have questions regarding this referral, please contact  the [facility] Referrals department at:             [phone]. Monday - Friday 8:00AM - 5:00PM.     Sincerely,    [facility]

## (undated) DEVICE — SYRINGE 10 ML CONTROL LL (25EA/BX 4BX/CA)

## (undated) DEVICE — ELECTRODE DUAL RETURN W/ CORD - (50/PK)

## (undated) DEVICE — SUCTION INSTRUMENT YANKAUER BULBOUS TIP W/O VENT (50EA/CA)

## (undated) DEVICE — DRAIN PENROSE STERILE 1/4 X - 18 IN  (25EA/BX)

## (undated) DEVICE — TRAY SRGPRP PVP IOD WT PRP - (20/CA)

## (undated) DEVICE — PACK MINOR BASIN - (2EA/CA)

## (undated) DEVICE — SLEEVE VASO CALF MED - (10PR/CA)

## (undated) DEVICE — SUTURE GENERAL

## (undated) DEVICE — NEEDLE NON SAFETY 25 GA X 1 1/2 IN HYPO (100EA/BX)

## (undated) DEVICE — STAPLER SKIN DISP - (6/BX 10BX/CA) VISISTAT

## (undated) DEVICE — TUBING CLEARLINK DUO-VENT - C-FLO (48EA/CA)

## (undated) DEVICE — LACTATED RINGERS INJ 1000 ML - (14EA/CA 60CA/PF)

## (undated) DEVICE — GOWN WARMING STANDARD FLEX - (30/CA)

## (undated) DEVICE — SODIUM CHL IRRIGATION 0.9% 1000ML (12EA/CA)

## (undated) DEVICE — SENSOR OXIMETER ADULT SPO2 RD SET (20EA/BX)

## (undated) DEVICE — CANISTER SUCTION 3000ML MECHANICAL FILTER AUTO SHUTOFF MEDI-VAC NONSTERILE LF DISP  (40EA/CA)

## (undated) DEVICE — SPONGE GAUZESTER 4 X 4 4PLY - (128PK/CA)

## (undated) DEVICE — COVER LIGHT HANDLE ALC PLUS DISP (18EA/BX)

## (undated) DEVICE — SET LEADWIRE 5 LEAD BEDSIDE DISPOSABLE ECG (1SET OF 5/EA)

## (undated) DEVICE — SET EXTENSION WITH 2 PORTS (48EA/CA) ***PART #2C8610 IS A SUBSTITUTE*****

## (undated) DEVICE — DRAPE LAPAROTOMY T SHEET - (12EA/CA)

## (undated) DEVICE — BLADE SURGICAL #15 - (50/BX 3BX/CA)